# Patient Record
Sex: FEMALE | Race: WHITE | NOT HISPANIC OR LATINO | Employment: UNEMPLOYED | URBAN - METROPOLITAN AREA
[De-identification: names, ages, dates, MRNs, and addresses within clinical notes are randomized per-mention and may not be internally consistent; named-entity substitution may affect disease eponyms.]

---

## 2019-10-10 ENCOUNTER — HOSPITAL ENCOUNTER (EMERGENCY)
Facility: HOSPITAL | Age: 29
Discharge: HOME | End: 2019-10-11
Attending: EMERGENCY MEDICINE | Admitting: EMERGENCY MEDICINE

## 2019-10-10 ENCOUNTER — APPOINTMENT (OUTPATIENT)
Dept: CT IMAGING | Facility: HOSPITAL | Age: 29
End: 2019-10-10

## 2019-10-10 DIAGNOSIS — R10.12 LEFT UPPER QUADRANT PAIN: Primary | ICD-10-CM

## 2019-10-10 LAB
ALBUMIN SERPL-MCNC: 5.4 G/DL (ref 3.5–5.2)
ALBUMIN/GLOB SERPL: 1.5 G/DL
ALP SERPL-CCNC: 62 U/L (ref 39–117)
ALT SERPL W P-5'-P-CCNC: 72 U/L (ref 1–33)
ANION GAP SERPL CALCULATED.3IONS-SCNC: 20.9 MMOL/L (ref 5–15)
AST SERPL-CCNC: 57 U/L (ref 1–32)
BASOPHILS # BLD AUTO: 0.04 10*3/MM3 (ref 0–0.2)
BASOPHILS NFR BLD AUTO: 0.3 % (ref 0–1.5)
BILIRUB SERPL-MCNC: 1.4 MG/DL (ref 0.2–1.2)
BUN BLD-MCNC: 18 MG/DL (ref 6–20)
BUN/CREAT SERPL: 18.6 (ref 7–25)
CALCIUM SPEC-SCNC: 9.7 MG/DL (ref 8.6–10.5)
CHLORIDE SERPL-SCNC: 95 MMOL/L (ref 98–107)
CO2 SERPL-SCNC: 24.1 MMOL/L (ref 22–29)
CREAT BLD-MCNC: 0.97 MG/DL (ref 0.57–1)
DEPRECATED RDW RBC AUTO: 48.8 FL (ref 37–54)
EOSINOPHIL # BLD AUTO: 0 10*3/MM3 (ref 0–0.4)
EOSINOPHIL NFR BLD AUTO: 0 % (ref 0.3–6.2)
ERYTHROCYTE [DISTWIDTH] IN BLOOD BY AUTOMATED COUNT: 13.5 % (ref 12.3–15.4)
GFR SERPL CREATININE-BSD FRML MDRD: 69 ML/MIN/1.73
GLOBULIN UR ELPH-MCNC: 3.6 GM/DL
GLUCOSE BLD-MCNC: 120 MG/DL (ref 65–99)
HCG SERPL QL: NEGATIVE
HCT VFR BLD AUTO: 48.8 % (ref 34–46.6)
HGB BLD-MCNC: 16.1 G/DL (ref 12–15.9)
IMM GRANULOCYTES # BLD AUTO: 0.07 10*3/MM3 (ref 0–0.05)
IMM GRANULOCYTES NFR BLD AUTO: 0.5 % (ref 0–0.5)
LIPASE SERPL-CCNC: 16 U/L (ref 13–60)
LYMPHOCYTES # BLD AUTO: 1.62 10*3/MM3 (ref 0.7–3.1)
LYMPHOCYTES NFR BLD AUTO: 11.9 % (ref 19.6–45.3)
MCH RBC QN AUTO: 32.6 PG (ref 26.6–33)
MCHC RBC AUTO-ENTMCNC: 33 G/DL (ref 31.5–35.7)
MCV RBC AUTO: 98.8 FL (ref 79–97)
MONOCYTES # BLD AUTO: 1.12 10*3/MM3 (ref 0.1–0.9)
MONOCYTES NFR BLD AUTO: 8.3 % (ref 5–12)
NEUTROPHILS # BLD AUTO: 10.71 10*3/MM3 (ref 1.7–7)
NEUTROPHILS NFR BLD AUTO: 79 % (ref 42.7–76)
NRBC BLD AUTO-RTO: 0 /100 WBC (ref 0–0.2)
PLATELET # BLD AUTO: 333 10*3/MM3 (ref 140–450)
PMV BLD AUTO: 10.4 FL (ref 6–12)
POTASSIUM BLD-SCNC: 3.9 MMOL/L (ref 3.5–5.2)
PROT SERPL-MCNC: 9 G/DL (ref 6–8.5)
RBC # BLD AUTO: 4.94 10*6/MM3 (ref 3.77–5.28)
SODIUM BLD-SCNC: 140 MMOL/L (ref 136–145)
WBC NRBC COR # BLD: 13.56 10*3/MM3 (ref 3.4–10.8)

## 2019-10-10 PROCEDURE — 25010000002 IOPAMIDOL 61 % SOLUTION: Performed by: EMERGENCY MEDICINE

## 2019-10-10 PROCEDURE — 80307 DRUG TEST PRSMV CHEM ANLYZR: CPT | Performed by: EMERGENCY MEDICINE

## 2019-10-10 PROCEDURE — 25010000002 HYDROMORPHONE PER 4 MG: Performed by: EMERGENCY MEDICINE

## 2019-10-10 PROCEDURE — 80053 COMPREHEN METABOLIC PANEL: CPT | Performed by: EMERGENCY MEDICINE

## 2019-10-10 PROCEDURE — 25010000002 ONDANSETRON PER 1 MG: Performed by: EMERGENCY MEDICINE

## 2019-10-10 PROCEDURE — 96374 THER/PROPH/DIAG INJ IV PUSH: CPT

## 2019-10-10 PROCEDURE — 99283 EMERGENCY DEPT VISIT LOW MDM: CPT

## 2019-10-10 PROCEDURE — 96375 TX/PRO/DX INJ NEW DRUG ADDON: CPT

## 2019-10-10 PROCEDURE — 83690 ASSAY OF LIPASE: CPT | Performed by: EMERGENCY MEDICINE

## 2019-10-10 PROCEDURE — 84703 CHORIONIC GONADOTROPIN ASSAY: CPT | Performed by: EMERGENCY MEDICINE

## 2019-10-10 PROCEDURE — 85025 COMPLETE CBC W/AUTO DIFF WBC: CPT | Performed by: EMERGENCY MEDICINE

## 2019-10-10 PROCEDURE — 81001 URINALYSIS AUTO W/SCOPE: CPT | Performed by: EMERGENCY MEDICINE

## 2019-10-10 PROCEDURE — 74177 CT ABD & PELVIS W/CONTRAST: CPT

## 2019-10-10 RX ORDER — SODIUM CHLORIDE 9 MG/ML
125 INJECTION, SOLUTION INTRAVENOUS CONTINUOUS
Status: DISCONTINUED | OUTPATIENT
Start: 2019-10-10 | End: 2019-10-11 | Stop reason: HOSPADM

## 2019-10-10 RX ORDER — SODIUM CHLORIDE 0.9 % (FLUSH) 0.9 %
10 SYRINGE (ML) INJECTION AS NEEDED
Status: DISCONTINUED | OUTPATIENT
Start: 2019-10-10 | End: 2019-10-11 | Stop reason: HOSPADM

## 2019-10-10 RX ORDER — FAMOTIDINE 10 MG/ML
20 INJECTION, SOLUTION INTRAVENOUS ONCE
Status: COMPLETED | OUTPATIENT
Start: 2019-10-10 | End: 2019-10-10

## 2019-10-10 RX ORDER — HYDROMORPHONE HYDROCHLORIDE 1 MG/ML
0.5 INJECTION, SOLUTION INTRAMUSCULAR; INTRAVENOUS; SUBCUTANEOUS ONCE
Status: COMPLETED | OUTPATIENT
Start: 2019-10-10 | End: 2019-10-10

## 2019-10-10 RX ORDER — ONDANSETRON 2 MG/ML
4 INJECTION INTRAMUSCULAR; INTRAVENOUS ONCE
Status: COMPLETED | OUTPATIENT
Start: 2019-10-10 | End: 2019-10-10

## 2019-10-10 RX ADMIN — ONDANSETRON 4 MG: 2 INJECTION INTRAMUSCULAR; INTRAVENOUS at 21:36

## 2019-10-10 RX ADMIN — SODIUM CHLORIDE 1000 ML: 9 INJECTION, SOLUTION INTRAVENOUS at 23:55

## 2019-10-10 RX ADMIN — SODIUM CHLORIDE 1000 ML: 9 INJECTION, SOLUTION INTRAVENOUS at 21:36

## 2019-10-10 RX ADMIN — HYDROMORPHONE HYDROCHLORIDE 0.5 MG: 1 INJECTION, SOLUTION INTRAMUSCULAR; INTRAVENOUS; SUBCUTANEOUS at 21:36

## 2019-10-10 RX ADMIN — SODIUM CHLORIDE 125 ML/HR: 9 INJECTION, SOLUTION INTRAVENOUS at 21:36

## 2019-10-10 RX ADMIN — FAMOTIDINE 20 MG: 10 INJECTION, SOLUTION INTRAVENOUS at 23:55

## 2019-10-10 RX ADMIN — IOPAMIDOL 85 ML: 612 INJECTION, SOLUTION INTRAVENOUS at 22:39

## 2019-10-10 NOTE — ED TRIAGE NOTES
Pt reports left sided abdominal pain that radiates into her back. Pt reports nausea and vomiting. Pt reports this has been going on since Sunday.

## 2019-10-11 VITALS
HEART RATE: 70 BPM | DIASTOLIC BLOOD PRESSURE: 90 MMHG | WEIGHT: 158 LBS | HEIGHT: 64 IN | BODY MASS INDEX: 26.98 KG/M2 | OXYGEN SATURATION: 99 % | SYSTOLIC BLOOD PRESSURE: 129 MMHG | TEMPERATURE: 98.8 F | RESPIRATION RATE: 16 BRPM

## 2019-10-11 LAB
AMPHET+METHAMPHET UR QL: NEGATIVE
BACTERIA UR QL AUTO: ABNORMAL /HPF
BARBITURATES UR QL SCN: NEGATIVE
BENZODIAZ UR QL SCN: NEGATIVE
BILIRUB UR QL STRIP: NEGATIVE
CANNABINOIDS SERPL QL: POSITIVE
CLARITY UR: CLEAR
COCAINE UR QL: NEGATIVE
COLOR UR: ABNORMAL
GLUCOSE UR STRIP-MCNC: NEGATIVE MG/DL
HGB UR QL STRIP.AUTO: ABNORMAL
HYALINE CASTS UR QL AUTO: ABNORMAL /LPF
KETONES UR QL STRIP: ABNORMAL
LEUKOCYTE ESTERASE UR QL STRIP.AUTO: NEGATIVE
METHADONE UR QL SCN: NEGATIVE
MUCOUS THREADS URNS QL MICRO: ABNORMAL /HPF
NITRITE UR QL STRIP: NEGATIVE
OPIATES UR QL: NEGATIVE
OXYCODONE UR QL SCN: NEGATIVE
PH UR STRIP.AUTO: 5.5 [PH] (ref 5–8)
PROT UR QL STRIP: ABNORMAL
RBC # UR: ABNORMAL /HPF
REF LAB TEST METHOD: ABNORMAL
SP GR UR STRIP: >=1.03 (ref 1–1.03)
SQUAMOUS #/AREA URNS HPF: ABNORMAL /HPF
UROBILINOGEN UR QL STRIP: ABNORMAL
WBC UR QL AUTO: ABNORMAL /HPF

## 2019-10-11 RX ORDER — HYOSCYAMINE SULFATE EXTENDED-RELEASE 0.38 MG/1
0.38 TABLET ORAL EVERY 12 HOURS PRN
Qty: 10 TABLET | Refills: 0 | Status: SHIPPED | OUTPATIENT
Start: 2019-10-11 | End: 2020-02-05

## 2019-10-11 RX ORDER — OMEPRAZOLE 20 MG/1
20 CAPSULE, DELAYED RELEASE ORAL DAILY
Qty: 30 CAPSULE | Refills: 0 | Status: SHIPPED | OUTPATIENT
Start: 2019-10-11 | End: 2019-11-10

## 2019-10-11 NOTE — ED PROVIDER NOTES
EMERGENCY DEPARTMENT ENCOUNTER    CHIEF COMPLAINT  Chief Complaint: LUQ abdominal pain  History given by: patient  History limited by: nothing  Room Number: 31/31  PMD: Provider, No Known      HPI:  Pt is a 28 y.o. female who presents complaining of constant LUQ abdominal pain that started 5 days ago. Patient states she gets 'stomach pain every day.' Pt also c/o N/V and decreased urination. Pt is able to tolerate liquids but admits to decreased PO intake. She was seen in an ED 3 months ago for the same symptoms. She has undergone CT abd/pelvis and been scoped by GI. Patient states her GI doctor has suggested cholecystectomy due to poor functioning gallbladder but she has refused. Patient has refused this procedure because her pain is on her left side. Pt denies diarrhea, fever, cough, CP, SOA and all other complaints at this time. Patient has been prescribed Prilosec in the past. Patient admits to occasional marijuana use and states her symptoms can occasionally be relieved with warm showers.  This pain is similar to her past episodes of pain in location, quality, and severity.    Duration:  5 days  Onset: gradual  Timing: constant  Location: LUQ abdomen  Radiation: none  Quality: pain  Intensity/Severity: moderate  Progression: unchanged  Associated Symptoms: N/V, decreased urination, decreased PO intake  Aggravating Factors: none  Alleviating Factors: none  Previous Episodes: 'stomach pain every day'  Treatment before arrival: none    PAST MEDICAL HISTORY  Active Ambulatory Problems     Diagnosis Date Noted   • No Active Ambulatory Problems     Resolved Ambulatory Problems     Diagnosis Date Noted   • No Resolved Ambulatory Problems     No Additional Past Medical History       PAST SURGICAL HISTORY  No past surgical history on file.    FAMILY HISTORY  No family history on file.    SOCIAL HISTORY  Social History     Socioeconomic History   • Marital status: Single     Spouse name: Not on file   • Number of  children: Not on file   • Years of education: Not on file   • Highest education level: Not on file       ALLERGIES  Patient has no known allergies.    REVIEW OF SYSTEMS  Review of Systems   Constitutional: Negative for fever.        Positive: decreased PO intake   HENT: Negative for sore throat.    Eyes: Negative.    Respiratory: Negative for cough and shortness of breath.    Cardiovascular: Negative for chest pain.   Gastrointestinal: Positive for abdominal pain (LUQ), nausea and vomiting. Negative for diarrhea.   Genitourinary: Positive for decreased urine volume. Negative for dysuria.   Musculoskeletal: Negative for neck pain.   Skin: Negative for rash.   Allergic/Immunologic: Negative.    Neurological: Negative for weakness, numbness and headaches.   Hematological: Negative.    Psychiatric/Behavioral: Negative.    All other systems reviewed and are negative.      PHYSICAL EXAM  ED Triage Vitals [10/10/19 1821]   Temp Heart Rate Resp BP SpO2   98.7 °F (37.1 °C) 103 18 -- 94 %       Physical Exam   Constitutional: She is oriented to person, place, and time. No distress.   HENT:   Head: Normocephalic and atraumatic.   Eyes: EOM are normal. Pupils are equal, round, and reactive to light.   Neck: Normal range of motion. Neck supple.   Cardiovascular: Normal rate, regular rhythm and normal heart sounds.   RRR   Pulmonary/Chest: Effort normal and breath sounds normal. No respiratory distress.   Abdominal: Soft. There is tenderness. There is no rebound and no guarding.   LUQ and mid-upper abdominal tenderness without guarding or rebound.   Musculoskeletal: Normal range of motion. She exhibits no edema.   Intact distal pulses.   Neurological: She is alert and oriented to person, place, and time. She has normal sensation and normal strength.   Skin: Skin is warm and dry. No rash noted.   Psychiatric: Mood and affect normal.   Nursing note and vitals reviewed.      LAB RESULTS  Lab Results (last 24 hours)     Procedure  Component Value Units Date/Time    CBC & Differential [[id number]] Collected:  10/10/19 2135    Specimen:  Blood Updated:  10/10/19 2153    Narrative:       The following orders were created for panel order CBC & Differential.  Procedure                               Abnormality         Status                     ---------                               -----------         ------                     CBC Auto Differential[[id number]]        Abnormal            Final result                 Please view results for these tests on the individual orders.    Comprehensive Metabolic Panel [[id number]]  (Abnormal) Collected:  10/10/19 2135    Specimen:  Blood Updated:  10/10/19 2217     Glucose 120 mg/dL      BUN 18 mg/dL      Creatinine 0.97 mg/dL      Sodium 140 mmol/L      Potassium 3.9 mmol/L      Chloride 95 mmol/L      CO2 24.1 mmol/L      Calcium 9.7 mg/dL      Total Protein 9.0 g/dL      Albumin 5.40 g/dL      ALT (SGPT) 72 U/L      AST (SGOT) 57 U/L      Comment: Specimen hemolyzed.  Results may be affected.        Alkaline Phosphatase 62 U/L      Total Bilirubin 1.4 mg/dL      eGFR Non African Amer 69 mL/min/1.73      Globulin 3.6 gm/dL      A/G Ratio 1.5 g/dL      BUN/Creatinine Ratio 18.6     Anion Gap 20.9 mmol/L     Narrative:       GFR Normal >60  Chronic Kidney Disease <60  Kidney Failure <15    Lipase [[id number]]  (Normal) Collected:  10/10/19 2135    Specimen:  Blood Updated:  10/10/19 2215     Lipase 16 U/L     hCG, Serum, Qualitative [[id number]]  (Normal) Collected:  10/10/19 2135    Specimen:  Blood Updated:  10/10/19 2213     HCG Qualitative Negative    CBC Auto Differential [[id number]]  (Abnormal) Collected:  10/10/19 2135    Specimen:  Blood Updated:  10/10/19 2153     WBC 13.56 10*3/mm3      RBC 4.94 10*6/mm3      Hemoglobin 16.1 g/dL      Hematocrit 48.8 %      MCV 98.8 fL      MCH 32.6 pg      MCHC 33.0 g/dL      RDW 13.5 %      RDW-SD 48.8 fl      MPV 10.4 fL      Platelets 333 10*3/mm3       Neutrophil % 79.0 %      Lymphocyte % 11.9 %      Monocyte % 8.3 %      Eosinophil % 0.0 %      Basophil % 0.3 %      Immature Grans % 0.5 %      Neutrophils, Absolute 10.71 10*3/mm3      Lymphocytes, Absolute 1.62 10*3/mm3      Monocytes, Absolute 1.12 10*3/mm3      Eosinophils, Absolute 0.00 10*3/mm3      Basophils, Absolute 0.04 10*3/mm3      Immature Grans, Absolute 0.07 10*3/mm3      nRBC 0.0 /100 WBC     Urinalysis With Microscopic If Indicated (No Culture) - Urine, Clean Catch [[id number]]  (Abnormal) Collected:  10/10/19 2356    Specimen:  Urine, Clean Catch Updated:  10/11/19 0033     Color, UA Dark Yellow     Appearance, UA Clear     pH, UA 5.5     Specific Gravity, UA >=1.030     Glucose, UA Negative     Ketones, UA 40 mg/dL (2+)     Bilirubin, UA Negative     Blood, UA Large (3+)     Protein,  mg/dL (2+)     Leuk Esterase, UA Negative     Nitrite, UA Negative     Urobilinogen, UA 1.0 E.U./dL    Urine Drug Screen - Urine, Clean Catch [[id number]]  (Abnormal) Collected:  10/10/19 2356    Specimen:  Urine, Clean Catch Updated:  10/11/19 0033     Amphet/Methamphet, Screen Negative     Barbiturates Screen, Urine Negative     Benzodiazepine Screen, Urine Negative     Cocaine Screen, Urine Negative     Opiate Screen Negative     THC, Screen, Urine Positive     Methadone Screen, Urine Negative     Oxycodone Screen, Urine Negative    Narrative:       Negative Thresholds For Drugs Screened:     Amphetamines               500 ng/ml   Barbiturates               200 ng/ml   Benzodiazepines            100 ng/ml   Cocaine                    300 ng/ml   Methadone                  300 ng/ml   Opiates                    300 ng/ml   Oxycodone                  100 ng/ml   THC                        50 ng/ml    The Normal Value for all drugs tested is negative. This report includes final unconfirmed screening results to be used for medical treatment purposes only. Unconfirmed results must not be used for non-medical  purposes such as employment or legal testing. Clinical consideration should be applied to any drug of abuse test, particulary when unconfirmed results are used.    Urinalysis, Microscopic Only - Urine, Clean Catch [[id number]]  (Abnormal) Collected:  10/10/19 2357    Specimen:  Urine, Clean Catch Updated:  10/11/19 0044     RBC, UA 6-12 /HPF      WBC, UA 0-2 /HPF      Bacteria, UA None Seen /HPF      Squamous Epithelial Cells, UA 0-2 /HPF      Hyaline Casts, UA 13-20 /LPF      Mucus, UA Small/1+ /HPF      Methodology Manual Light Microscopy          I ordered the above labs and reviewed the results.      RADIOLOGY  CT Abdomen Pelvis With Contrast   Final Result   No acute findings.       Radiation dose reduction techniques were utilized, including automated   exposure control and exposure modulation based on body size.       This report was finalized on 10/10/2019 11:08 PM by Dr. Monique Pa M.D.               I ordered the above noted radiological studies. Interpreted by radiologist. Reviewed by me in PACS.         PROGRESS AND CONSULTS  ED Course as of Oct 11 0132   Thu Oct 10, 2019   2223 2 months ago she had an elevation of white blood cell count of 13,000 as well as 6 months ago.  Patient's pain is chronic recurrent abdominal pain with vomiting.  I do not suspect an infectious etiology she has no fever the mild elevation of white blood cell count very well could be reactive from the vomiting. WBC: (!) 13.56 [MM]   2224 No significant change previous hemoglobins but been in the mid 15 range Hemoglobin: (!) 16.1 [MM]   2224 Has mild elevation of liver function tests.  She has had 2 HIDA scans which shows poor functioning gallbladder but the patient is refused to have her gallbladder removed because her pain is on the left side.  I think this is unlikely Gart gallbladder etiology ALT (SGPT): (!) 72 [MM]   2225 CO2: 24.1 [MM]   Fri Oct 11, 2019   0051 I informed the patient the results of the test.  I am not  certain of the exact cause of the patient's symptoms.  The patient's repeat exam, test results, and history are not concerning for a serious etiology of their symptoms. I explained  to the patient that it is common, despite all our testing, that we cannot find the cause of someone symptoms in the emergency department.  Patient may present early in the disease process or it  is not readily apparent the cause of the patient's symptoms.  I instructed  the patient to return to the emergency department if fever develops, worsening of pain, or worsening of symptoms.  I instructed the patient to follow up with their physician for further evaluation in 2-3 days.  The patient understands and agrees with the treatment plan.    [MM]      ED Course User Index  [MM] Maikol Hale MD      2035. Ordered Dilaudid and Zofran.     2036. Ordered labs and CT abd/pelvis.    2320. Rechecked the patient who is stable and in NAD. No vomiting since she has been in the ED. Informed patient of unremarkable labs and negative CT abd/pelvis. Discussed plan to administer another bag of IVF for hydration and Pepcid and obtain UA prior to disposition. Discussed at length cannabinoid hyperemesis syndrome and IBS and the need to follow with gastroenterology. Advised patient to minimize or terminate marijuana use and evaluate symptom change or resolution. Pt and family agree with this plan.     2332. Ordered Pepcid.     0035. Rechecked the patient who is resting comfortably. Informed patient of negative UA. Patient will be discharged with prescriptions to manage her symptoms. Pt understands and agrees with the plan, all questions answered.      MEDICAL DECISION MAKING  Results were reviewed/discussed with the patient and they were also made aware of online access. Pt also made aware that some labs, such as cultures, will not be resulted during ER visit and follow up with PMD is necessary.     MDM  Number of Diagnoses or Management Options  Chronic  abdominal pain:      Amount and/or Complexity of Data Reviewed  Clinical lab tests: ordered and reviewed (WBC 13.56)  Tests in the radiology section of CPT®: ordered and reviewed (Nothing acute seen on CT abd/pelvis)  Decide to obtain previous medical records or to obtain history from someone other than the patient: yes (Epic)  Review and summarize past medical records: yes (Seen 7/24/2019 for LUQ abdominal pain at [facility]. CT abd/pelvis at this time showed no acute abnormalities. Patient was also seen 4/5/2019 for abdominal pain and vomiting at [facility]. HIDA scan in the past which was concerning for biliary dyskinesia. 8 ED visits in the past two years for abdominal pain.)    Patient Progress  Patient progress: stable         DIAGNOSIS  Final diagnoses:   Left upper quadrant pain: Acute on chronic reoccurring       DISPOSITION  DISCHARGE    Patient discharged in stable condition.    Reviewed implications of results, diagnosis, meds, responsibility to follow up, warning signs and symptoms of possible worsening, potential complications and reasons to return to ER.    Patient/Family voiced understanding of above instructions.    Discussed plan for discharge, as there is no emergent indication for admission. Patient referred to primary care provider for BP management due to today's BP. Pt/family is agreeable and understands need for follow up and repeat testing.  Pt is aware that discharge does not mean that nothing is wrong but it indicates no emergency is present that requires admission and they must continue care with follow-up as given below or physician of their choice.     FOLLOW-UP  Brinda Muse MD  [address] [address]  [phone]      Follow-up with her in the next week., Return if pain worsens, If symptoms worsen, shortness of breath, fever, any concerns         Medication List      New Prescriptions    hyoscyamine 0.375 MG 12 hr tablet  Commonly known as:  LEVBID  Take 1 tablet  by mouth Every 12 (Twelve) Hours As Needed for Cramping for   up to 10 doses.     omeprazole 20 MG capsule  Commonly known as:  priLOSEC  Take 1 capsule by mouth Daily for 30 doses.            Latest Documented Vital Signs:  As of 1:32 AM  BP- 129/90 HR- 70 Temp- 98.8 °F (37.1 °C) (Oral) O2 sat- 99%    --  Documentation assistance provided by sloan Tran for Dr Alexia MD.  Information recorded by the scribe was done at my direction and has been verified and validated by me.       Lucie Tran  10/11/19 0109       Maikol Hale MD  10/11/19 0138

## 2019-10-11 NOTE — DISCHARGE INSTRUCTIONS
Start with clear liquids and advance diet as tolerated.  No fatty foods or processed foods.  No alcohol or caffeine.  Try not to smoke marijuana.  Take Levbid and Prilosec as prescribed.

## 2019-12-24 ENCOUNTER — HOSPITAL ENCOUNTER (INPATIENT)
Facility: HOSPITAL | Age: 29
LOS: 7 days | Discharge: HOME | End: 2019-12-31
Attending: EMERGENCY MEDICINE | Admitting: HOSPITALIST

## 2019-12-24 ENCOUNTER — APPOINTMENT (OUTPATIENT)
Dept: GENERAL RADIOLOGY | Facility: HOSPITAL | Age: 29
End: 2019-12-24

## 2019-12-24 ENCOUNTER — APPOINTMENT (OUTPATIENT)
Dept: CT IMAGING | Facility: HOSPITAL | Age: 29
End: 2019-12-24

## 2019-12-24 DIAGNOSIS — K52.9 COLITIS: Primary | ICD-10-CM

## 2019-12-24 PROBLEM — R10.9 CHRONIC ABDOMINAL PAIN: Status: ACTIVE | Noted: 2019-12-24

## 2019-12-24 PROBLEM — D72.829 LEUKOCYTOSIS: Status: ACTIVE | Noted: 2019-12-24

## 2019-12-24 PROBLEM — D75.89 MACROCYTOSIS: Status: ACTIVE | Noted: 2019-12-24

## 2019-12-24 PROBLEM — G89.29 CHRONIC ABDOMINAL PAIN: Status: ACTIVE | Noted: 2019-12-24

## 2019-12-24 PROBLEM — K92.1 HEMATOCHEZIA: Status: ACTIVE | Noted: 2019-12-24

## 2019-12-24 PROBLEM — F12.10 TETRAHYDROCANNABINOL (THC) USE DISORDER, MILD, ABUSE: Status: ACTIVE | Noted: 2019-12-24

## 2019-12-24 LAB
ALBUMIN SERPL-MCNC: 5.2 G/DL (ref 3.5–5.2)
ALBUMIN/GLOB SERPL: 1.4 G/DL
ALP SERPL-CCNC: 63 U/L (ref 39–117)
ALT SERPL W P-5'-P-CCNC: 17 U/L (ref 1–33)
AMPHET+METHAMPHET UR QL: NEGATIVE
ANION GAP SERPL CALCULATED.3IONS-SCNC: 22.2 MMOL/L (ref 5–15)
AST SERPL-CCNC: 19 U/L (ref 1–32)
BACTERIA UR QL AUTO: ABNORMAL /HPF
BARBITURATES UR QL SCN: NEGATIVE
BASOPHILS # BLD AUTO: 0.02 10*3/MM3 (ref 0–0.2)
BASOPHILS NFR BLD AUTO: 0.1 % (ref 0–1.5)
BENZODIAZ UR QL SCN: NEGATIVE
BILIRUB SERPL-MCNC: 0.5 MG/DL (ref 0.2–1.2)
BILIRUB UR QL STRIP: NEGATIVE
BUN BLD-MCNC: 13 MG/DL (ref 6–20)
BUN/CREAT SERPL: 15.9 (ref 7–25)
CALCIUM SPEC-SCNC: 10.3 MG/DL (ref 8.6–10.5)
CANNABINOIDS SERPL QL: POSITIVE
CHLORIDE SERPL-SCNC: 100 MMOL/L (ref 98–107)
CLARITY UR: CLEAR
CO2 SERPL-SCNC: 17.8 MMOL/L (ref 22–29)
COCAINE UR QL: NEGATIVE
COLOR UR: YELLOW
CREAT BLD-MCNC: 0.82 MG/DL (ref 0.57–1)
CRP SERPL-MCNC: 1.4 MG/DL (ref 0–0.5)
D-LACTATE SERPL-SCNC: 0.7 MMOL/L (ref 0.5–2)
DEPRECATED RDW RBC AUTO: 45.3 FL (ref 37–54)
EOSINOPHIL # BLD AUTO: 0 10*3/MM3 (ref 0–0.4)
EOSINOPHIL NFR BLD AUTO: 0 % (ref 0.3–6.2)
ERYTHROCYTE [DISTWIDTH] IN BLOOD BY AUTOMATED COUNT: 12.7 % (ref 12.3–15.4)
ERYTHROCYTE [SEDIMENTATION RATE] IN BLOOD: 14 MM/HR (ref 0–20)
GFR SERPL CREATININE-BSD FRML MDRD: 83 ML/MIN/1.73
GLOBULIN UR ELPH-MCNC: 3.6 GM/DL
GLUCOSE BLD-MCNC: 149 MG/DL (ref 65–99)
GLUCOSE UR STRIP-MCNC: ABNORMAL MG/DL
HCG SERPL QL: NEGATIVE
HCT VFR BLD AUTO: 44.3 % (ref 34–46.6)
HGB BLD-MCNC: 14.8 G/DL (ref 12–15.9)
HGB UR QL STRIP.AUTO: ABNORMAL
HYALINE CASTS UR QL AUTO: ABNORMAL /LPF
IMM GRANULOCYTES # BLD AUTO: 0.11 10*3/MM3 (ref 0–0.05)
IMM GRANULOCYTES NFR BLD AUTO: 0.5 % (ref 0–0.5)
KETONES UR QL STRIP: ABNORMAL
LEUKOCYTE ESTERASE UR QL STRIP.AUTO: NEGATIVE
LIPASE SERPL-CCNC: 14 U/L (ref 13–60)
LYMPHOCYTES # BLD AUTO: 1.08 10*3/MM3 (ref 0.7–3.1)
LYMPHOCYTES NFR BLD AUTO: 5.3 % (ref 19.6–45.3)
MCH RBC QN AUTO: 32.8 PG (ref 26.6–33)
MCHC RBC AUTO-ENTMCNC: 33.4 G/DL (ref 31.5–35.7)
MCV RBC AUTO: 98.2 FL (ref 79–97)
METHADONE UR QL SCN: NEGATIVE
MONOCYTES # BLD AUTO: 0.63 10*3/MM3 (ref 0.1–0.9)
MONOCYTES NFR BLD AUTO: 3.1 % (ref 5–12)
NEUTROPHILS # BLD AUTO: 18.44 10*3/MM3 (ref 1.7–7)
NEUTROPHILS NFR BLD AUTO: 91 % (ref 42.7–76)
NITRITE UR QL STRIP: NEGATIVE
NRBC BLD AUTO-RTO: 0 /100 WBC (ref 0–0.2)
OPIATES UR QL: NEGATIVE
OXYCODONE UR QL SCN: NEGATIVE
PH UR STRIP.AUTO: 7 [PH] (ref 5–8)
PLATELET # BLD AUTO: 333 10*3/MM3 (ref 140–450)
PMV BLD AUTO: 10.1 FL (ref 6–12)
POTASSIUM BLD-SCNC: 3.7 MMOL/L (ref 3.5–5.2)
PROT SERPL-MCNC: 8.8 G/DL (ref 6–8.5)
PROT UR QL STRIP: ABNORMAL
RBC # BLD AUTO: 4.51 10*6/MM3 (ref 3.77–5.28)
RBC # UR: ABNORMAL /HPF
REF LAB TEST METHOD: ABNORMAL
SODIUM BLD-SCNC: 140 MMOL/L (ref 136–145)
SP GR UR STRIP: >=1.03 (ref 1–1.03)
SQUAMOUS #/AREA URNS HPF: ABNORMAL /HPF
UROBILINOGEN UR QL STRIP: ABNORMAL
WBC NRBC COR # BLD: 20.28 10*3/MM3 (ref 3.4–10.8)
WBC UR QL AUTO: ABNORMAL /HPF

## 2019-12-24 PROCEDURE — 80053 COMPREHEN METABOLIC PANEL: CPT | Performed by: EMERGENCY MEDICINE

## 2019-12-24 PROCEDURE — 25010000002 ONDANSETRON PER 1 MG: Performed by: NURSE PRACTITIONER

## 2019-12-24 PROCEDURE — 99254 IP/OBS CNSLTJ NEW/EST MOD 60: CPT | Performed by: INTERNAL MEDICINE

## 2019-12-24 PROCEDURE — 83605 ASSAY OF LACTIC ACID: CPT | Performed by: EMERGENCY MEDICINE

## 2019-12-24 PROCEDURE — 25010000002 LEVOFLOXACIN PER 250 MG: Performed by: EMERGENCY MEDICINE

## 2019-12-24 PROCEDURE — 83690 ASSAY OF LIPASE: CPT | Performed by: EMERGENCY MEDICINE

## 2019-12-24 PROCEDURE — 80307 DRUG TEST PRSMV CHEM ANLYZR: CPT | Performed by: EMERGENCY MEDICINE

## 2019-12-24 PROCEDURE — 25010000002 HYDROMORPHONE PER 4 MG: Performed by: EMERGENCY MEDICINE

## 2019-12-24 PROCEDURE — 86140 C-REACTIVE PROTEIN: CPT | Performed by: NURSE PRACTITIONER

## 2019-12-24 PROCEDURE — 99283 EMERGENCY DEPT VISIT LOW MDM: CPT

## 2019-12-24 PROCEDURE — 71046 X-RAY EXAM CHEST 2 VIEWS: CPT

## 2019-12-24 PROCEDURE — 25010000002 IOPAMIDOL 61 % SOLUTION: Performed by: EMERGENCY MEDICINE

## 2019-12-24 PROCEDURE — 84703 CHORIONIC GONADOTROPIN ASSAY: CPT | Performed by: EMERGENCY MEDICINE

## 2019-12-24 PROCEDURE — 25010000002 ONDANSETRON PER 1 MG: Performed by: EMERGENCY MEDICINE

## 2019-12-24 PROCEDURE — 85652 RBC SED RATE AUTOMATED: CPT | Performed by: NURSE PRACTITIONER

## 2019-12-24 PROCEDURE — 85025 COMPLETE CBC W/AUTO DIFF WBC: CPT | Performed by: EMERGENCY MEDICINE

## 2019-12-24 PROCEDURE — 74177 CT ABD & PELVIS W/CONTRAST: CPT

## 2019-12-24 PROCEDURE — 25010000002 MORPHINE PER 10 MG: Performed by: NURSE PRACTITIONER

## 2019-12-24 PROCEDURE — 87040 BLOOD CULTURE FOR BACTERIA: CPT | Performed by: EMERGENCY MEDICINE

## 2019-12-24 PROCEDURE — 81001 URINALYSIS AUTO W/SCOPE: CPT | Performed by: EMERGENCY MEDICINE

## 2019-12-24 RX ORDER — SODIUM CHLORIDE 0.9 % (FLUSH) 0.9 %
10 SYRINGE (ML) INJECTION EVERY 12 HOURS SCHEDULED
Status: DISCONTINUED | OUTPATIENT
Start: 2019-12-24 | End: 2019-12-31 | Stop reason: HOSPADM

## 2019-12-24 RX ORDER — ONDANSETRON 2 MG/ML
4 INJECTION INTRAMUSCULAR; INTRAVENOUS ONCE
Status: COMPLETED | OUTPATIENT
Start: 2019-12-24 | End: 2019-12-24

## 2019-12-24 RX ORDER — SODIUM CHLORIDE 9 MG/ML
100 INJECTION, SOLUTION INTRAVENOUS CONTINUOUS
Status: DISCONTINUED | OUTPATIENT
Start: 2019-12-24 | End: 2019-12-31 | Stop reason: HOSPADM

## 2019-12-24 RX ORDER — ACETAMINOPHEN 325 MG/1
650 TABLET ORAL EVERY 4 HOURS PRN
Status: DISCONTINUED | OUTPATIENT
Start: 2019-12-24 | End: 2019-12-31 | Stop reason: HOSPADM

## 2019-12-24 RX ORDER — ACETAMINOPHEN 650 MG/1
650 SUPPOSITORY RECTAL EVERY 4 HOURS PRN
Status: DISCONTINUED | OUTPATIENT
Start: 2019-12-24 | End: 2019-12-31 | Stop reason: HOSPADM

## 2019-12-24 RX ORDER — SODIUM CHLORIDE 0.9 % (FLUSH) 0.9 %
10 SYRINGE (ML) INJECTION AS NEEDED
Status: DISCONTINUED | OUTPATIENT
Start: 2019-12-24 | End: 2019-12-31 | Stop reason: HOSPADM

## 2019-12-24 RX ORDER — MORPHINE SULFATE 2 MG/ML
4 INJECTION, SOLUTION INTRAMUSCULAR; INTRAVENOUS EVERY 4 HOURS PRN
Status: DISCONTINUED | OUTPATIENT
Start: 2019-12-24 | End: 2019-12-31 | Stop reason: HOSPADM

## 2019-12-24 RX ORDER — LEVOFLOXACIN 5 MG/ML
500 INJECTION, SOLUTION INTRAVENOUS ONCE
Status: COMPLETED | OUTPATIENT
Start: 2019-12-24 | End: 2019-12-24

## 2019-12-24 RX ORDER — FAMOTIDINE 10 MG/ML
20 INJECTION, SOLUTION INTRAVENOUS ONCE
Status: COMPLETED | OUTPATIENT
Start: 2019-12-24 | End: 2019-12-24

## 2019-12-24 RX ORDER — ACETAMINOPHEN 160 MG/5ML
650 SOLUTION ORAL EVERY 4 HOURS PRN
Status: DISCONTINUED | OUTPATIENT
Start: 2019-12-24 | End: 2019-12-31 | Stop reason: HOSPADM

## 2019-12-24 RX ORDER — ONDANSETRON 4 MG/1
4 TABLET, FILM COATED ORAL EVERY 6 HOURS PRN
Status: DISCONTINUED | OUTPATIENT
Start: 2019-12-24 | End: 2019-12-31 | Stop reason: HOSPADM

## 2019-12-24 RX ORDER — ONDANSETRON 2 MG/ML
4 INJECTION INTRAMUSCULAR; INTRAVENOUS EVERY 6 HOURS PRN
Status: DISCONTINUED | OUTPATIENT
Start: 2019-12-24 | End: 2019-12-31 | Stop reason: HOSPADM

## 2019-12-24 RX ORDER — LEVOFLOXACIN 5 MG/ML
500 INJECTION, SOLUTION INTRAVENOUS EVERY 24 HOURS
Status: COMPLETED | OUTPATIENT
Start: 2019-12-25 | End: 2019-12-29

## 2019-12-24 RX ORDER — HYDROMORPHONE HYDROCHLORIDE 1 MG/ML
0.5 INJECTION, SOLUTION INTRAMUSCULAR; INTRAVENOUS; SUBCUTANEOUS ONCE
Status: COMPLETED | OUTPATIENT
Start: 2019-12-24 | End: 2019-12-24

## 2019-12-24 RX ADMIN — ONDANSETRON 4 MG: 2 INJECTION INTRAMUSCULAR; INTRAVENOUS at 16:20

## 2019-12-24 RX ADMIN — IOPAMIDOL 100 ML: 612 INJECTION, SOLUTION INTRAVENOUS at 09:37

## 2019-12-24 RX ADMIN — SODIUM CHLORIDE 1000 ML: 9 INJECTION, SOLUTION INTRAVENOUS at 08:59

## 2019-12-24 RX ADMIN — ONDANSETRON 4 MG: 2 INJECTION INTRAMUSCULAR; INTRAVENOUS at 08:31

## 2019-12-24 RX ADMIN — METRONIDAZOLE 500 MG: 500 INJECTION, SOLUTION INTRAVENOUS at 11:46

## 2019-12-24 RX ADMIN — METRONIDAZOLE 500 MG: 500 INJECTION, SOLUTION INTRAVENOUS at 19:34

## 2019-12-24 RX ADMIN — MORPHINE SULFATE 4 MG: 2 INJECTION, SOLUTION INTRAMUSCULAR; INTRAVENOUS at 16:20

## 2019-12-24 RX ADMIN — FAMOTIDINE 20 MG: 10 INJECTION INTRAVENOUS at 08:31

## 2019-12-24 RX ADMIN — HYDROMORPHONE HYDROCHLORIDE 0.5 MG: 1 INJECTION, SOLUTION INTRAMUSCULAR; INTRAVENOUS; SUBCUTANEOUS at 08:31

## 2019-12-24 RX ADMIN — LEVOFLOXACIN 500 MG: 5 INJECTION, SOLUTION INTRAVENOUS at 10:42

## 2019-12-25 PROBLEM — D72.829 LEUKOCYTOSIS: Status: RESOLVED | Noted: 2019-12-24 | Resolved: 2019-12-25

## 2019-12-25 LAB
ADV 40+41 DNA STL QL NAA+NON-PROBE: NOT DETECTED
ANION GAP SERPL CALCULATED.3IONS-SCNC: 9.7 MMOL/L (ref 5–15)
ASTRO TYP 1-8 RNA STL QL NAA+NON-PROBE: NOT DETECTED
BUN BLD-MCNC: 9 MG/DL (ref 6–20)
BUN/CREAT SERPL: 11.7 (ref 7–25)
C CAYETANENSIS DNA STL QL NAA+NON-PROBE: NOT DETECTED
CALCIUM SPEC-SCNC: 8.9 MG/DL (ref 8.6–10.5)
CAMPY SP DNA.DIARRHEA STL QL NAA+PROBE: NOT DETECTED
CHLORIDE SERPL-SCNC: 108 MMOL/L (ref 98–107)
CO2 SERPL-SCNC: 25.3 MMOL/L (ref 22–29)
CREAT BLD-MCNC: 0.77 MG/DL (ref 0.57–1)
CRYPTOSP STL CULT: NOT DETECTED
DEPRECATED RDW RBC AUTO: 44.1 FL (ref 37–54)
E COLI DNA SPEC QL NAA+PROBE: NOT DETECTED
E HISTOLYT AG STL-ACNC: NOT DETECTED
EAEC PAA PLAS AGGR+AATA ST NAA+NON-PRB: NOT DETECTED
EC STX1 + STX2 GENES STL NAA+PROBE: NOT DETECTED
EPEC EAE GENE STL QL NAA+NON-PROBE: NOT DETECTED
ERYTHROCYTE [DISTWIDTH] IN BLOOD BY AUTOMATED COUNT: 12.5 % (ref 12.3–15.4)
ETEC LTA+ST1A+ST1B TOX ST NAA+NON-PROBE: NOT DETECTED
FOLATE SERPL-MCNC: 2.46 NG/ML (ref 4.78–24.2)
G LAMBLIA DNA SPEC QL NAA+PROBE: NOT DETECTED
GFR SERPL CREATININE-BSD FRML MDRD: 89 ML/MIN/1.73
GLUCOSE BLD-MCNC: 83 MG/DL (ref 65–99)
HCT VFR BLD AUTO: 36.6 % (ref 34–46.6)
HGB BLD-MCNC: 12.5 G/DL (ref 12–15.9)
LACTOFERRIN STL QL LA: NEGATIVE
MCH RBC QN AUTO: 33.4 PG (ref 26.6–33)
MCHC RBC AUTO-ENTMCNC: 34.2 G/DL (ref 31.5–35.7)
MCV RBC AUTO: 97.9 FL (ref 79–97)
NOROVIRUS GI+II RNA STL QL NAA+NON-PROBE: NOT DETECTED
P SHIGELLOIDES DNA STL QL NAA+PROBE: NOT DETECTED
PLATELET # BLD AUTO: 201 10*3/MM3 (ref 140–450)
PMV BLD AUTO: 9.9 FL (ref 6–12)
POTASSIUM BLD-SCNC: 3.6 MMOL/L (ref 3.5–5.2)
RBC # BLD AUTO: 3.74 10*6/MM3 (ref 3.77–5.28)
RV RNA STL NAA+PROBE: NOT DETECTED
SALMONELLA DNA SPEC QL NAA+PROBE: NOT DETECTED
SAPO I+II+IV+V RNA STL QL NAA+NON-PROBE: NOT DETECTED
SHIGELLA SP+EIEC IPAH STL QL NAA+PROBE: NOT DETECTED
SODIUM BLD-SCNC: 143 MMOL/L (ref 136–145)
V CHOLERAE DNA SPEC QL NAA+PROBE: NOT DETECTED
VIBRIO DNA SPEC NAA+PROBE: NOT DETECTED
VIT B12 BLD-MCNC: 287 PG/ML (ref 211–946)
WBC NRBC COR # BLD: 8.78 10*3/MM3 (ref 3.4–10.8)
YERSINIA STL CULT: NOT DETECTED

## 2019-12-25 PROCEDURE — 25010000002 MORPHINE PER 10 MG: Performed by: NURSE PRACTITIONER

## 2019-12-25 PROCEDURE — 83993 ASSAY FOR CALPROTECTIN FECAL: CPT | Performed by: NURSE PRACTITIONER

## 2019-12-25 PROCEDURE — 85027 COMPLETE CBC AUTOMATED: CPT | Performed by: NURSE PRACTITIONER

## 2019-12-25 PROCEDURE — 99232 SBSQ HOSP IP/OBS MODERATE 35: CPT | Performed by: INTERNAL MEDICINE

## 2019-12-25 PROCEDURE — 80048 BASIC METABOLIC PNL TOTAL CA: CPT | Performed by: NURSE PRACTITIONER

## 2019-12-25 PROCEDURE — 0097U HC BIOFIRE FILMARRAY GI PANEL: CPT | Performed by: HOSPITALIST

## 2019-12-25 PROCEDURE — 25010000002 LEVOFLOXACIN PER 250 MG: Performed by: NURSE PRACTITIONER

## 2019-12-25 PROCEDURE — 82607 VITAMIN B-12: CPT | Performed by: NURSE PRACTITIONER

## 2019-12-25 PROCEDURE — 83631 LACTOFERRIN FECAL (QUANT): CPT | Performed by: NURSE PRACTITIONER

## 2019-12-25 PROCEDURE — 25010000002 ONDANSETRON PER 1 MG: Performed by: NURSE PRACTITIONER

## 2019-12-25 PROCEDURE — 82746 ASSAY OF FOLIC ACID SERUM: CPT | Performed by: NURSE PRACTITIONER

## 2019-12-25 RX ORDER — FAMOTIDINE 20 MG/1
20 TABLET, FILM COATED ORAL
Status: DISCONTINUED | OUTPATIENT
Start: 2019-12-25 | End: 2019-12-31 | Stop reason: HOSPADM

## 2019-12-25 RX ADMIN — FAMOTIDINE 20 MG: 20 TABLET, FILM COATED ORAL at 16:34

## 2019-12-25 RX ADMIN — SODIUM CHLORIDE, PRESERVATIVE FREE 10 ML: 5 INJECTION INTRAVENOUS at 20:09

## 2019-12-25 RX ADMIN — ONDANSETRON 4 MG: 2 INJECTION INTRAMUSCULAR; INTRAVENOUS at 14:48

## 2019-12-25 RX ADMIN — MORPHINE SULFATE 4 MG: 2 INJECTION, SOLUTION INTRAMUSCULAR; INTRAVENOUS at 13:01

## 2019-12-25 RX ADMIN — ONDANSETRON 4 MG: 2 INJECTION INTRAMUSCULAR; INTRAVENOUS at 20:09

## 2019-12-25 RX ADMIN — METRONIDAZOLE 500 MG: 500 INJECTION, SOLUTION INTRAVENOUS at 18:44

## 2019-12-25 RX ADMIN — SODIUM CHLORIDE 100 ML/HR: 9 INJECTION, SOLUTION INTRAVENOUS at 02:27

## 2019-12-25 RX ADMIN — MORPHINE SULFATE 4 MG: 2 INJECTION, SOLUTION INTRAMUSCULAR; INTRAVENOUS at 02:28

## 2019-12-25 RX ADMIN — ONDANSETRON 4 MG: 2 INJECTION INTRAMUSCULAR; INTRAVENOUS at 02:28

## 2019-12-25 RX ADMIN — LEVOFLOXACIN 500 MG: 5 INJECTION, SOLUTION INTRAVENOUS at 08:56

## 2019-12-25 RX ADMIN — METRONIDAZOLE 500 MG: 500 INJECTION, SOLUTION INTRAVENOUS at 02:21

## 2019-12-25 RX ADMIN — ONDANSETRON 4 MG: 2 INJECTION INTRAMUSCULAR; INTRAVENOUS at 08:54

## 2019-12-25 RX ADMIN — SERTRALINE 50 MG: 50 TABLET, FILM COATED ORAL at 13:01

## 2019-12-25 RX ADMIN — METRONIDAZOLE 500 MG: 500 INJECTION, SOLUTION INTRAVENOUS at 11:09

## 2019-12-25 RX ADMIN — SODIUM CHLORIDE 100 ML/HR: 9 INJECTION, SOLUTION INTRAVENOUS at 16:36

## 2019-12-25 RX ADMIN — MORPHINE SULFATE 4 MG: 2 INJECTION, SOLUTION INTRAMUSCULAR; INTRAVENOUS at 20:09

## 2019-12-25 RX ADMIN — MORPHINE SULFATE 4 MG: 2 INJECTION, SOLUTION INTRAMUSCULAR; INTRAVENOUS at 08:54

## 2019-12-26 LAB
BASOPHILS # BLD AUTO: 0.04 10*3/MM3 (ref 0–0.2)
BASOPHILS NFR BLD AUTO: 0.6 % (ref 0–1.5)
DEPRECATED RDW RBC AUTO: 44.2 FL (ref 37–54)
EOSINOPHIL # BLD AUTO: 0.04 10*3/MM3 (ref 0–0.4)
EOSINOPHIL NFR BLD AUTO: 0.6 % (ref 0.3–6.2)
ERYTHROCYTE [DISTWIDTH] IN BLOOD BY AUTOMATED COUNT: 12.6 % (ref 12.3–15.4)
HCT VFR BLD AUTO: 35.1 % (ref 34–46.6)
HGB BLD-MCNC: 12.1 G/DL (ref 12–15.9)
IMM GRANULOCYTES # BLD AUTO: 0.03 10*3/MM3 (ref 0–0.05)
IMM GRANULOCYTES NFR BLD AUTO: 0.4 % (ref 0–0.5)
LYMPHOCYTES # BLD AUTO: 1.92 10*3/MM3 (ref 0.7–3.1)
LYMPHOCYTES NFR BLD AUTO: 27.2 % (ref 19.6–45.3)
MCH RBC QN AUTO: 33 PG (ref 26.6–33)
MCHC RBC AUTO-ENTMCNC: 34.5 G/DL (ref 31.5–35.7)
MCV RBC AUTO: 95.6 FL (ref 79–97)
MONOCYTES # BLD AUTO: 0.52 10*3/MM3 (ref 0.1–0.9)
MONOCYTES NFR BLD AUTO: 7.4 % (ref 5–12)
NEUTROPHILS # BLD AUTO: 4.5 10*3/MM3 (ref 1.7–7)
NEUTROPHILS NFR BLD AUTO: 63.8 % (ref 42.7–76)
NRBC BLD AUTO-RTO: 0 /100 WBC (ref 0–0.2)
PLATELET # BLD AUTO: 179 10*3/MM3 (ref 140–450)
PMV BLD AUTO: 10.1 FL (ref 6–12)
RBC # BLD AUTO: 3.67 10*6/MM3 (ref 3.77–5.28)
WBC NRBC COR # BLD: 7.05 10*3/MM3 (ref 3.4–10.8)

## 2019-12-26 PROCEDURE — 85025 COMPLETE CBC W/AUTO DIFF WBC: CPT | Performed by: INTERNAL MEDICINE

## 2019-12-26 PROCEDURE — 25010000002 ONDANSETRON PER 1 MG: Performed by: NURSE PRACTITIONER

## 2019-12-26 PROCEDURE — 25010000002 LEVOFLOXACIN PER 250 MG: Performed by: NURSE PRACTITIONER

## 2019-12-26 PROCEDURE — 99232 SBSQ HOSP IP/OBS MODERATE 35: CPT | Performed by: INTERNAL MEDICINE

## 2019-12-26 RX ORDER — METRONIDAZOLE 250 MG/1
500 TABLET ORAL EVERY 8 HOURS SCHEDULED
Status: DISCONTINUED | OUTPATIENT
Start: 2019-12-26 | End: 2019-12-31 | Stop reason: HOSPADM

## 2019-12-26 RX ADMIN — METRONIDAZOLE 500 MG: 500 INJECTION, SOLUTION INTRAVENOUS at 03:44

## 2019-12-26 RX ADMIN — SODIUM CHLORIDE, PRESERVATIVE FREE 10 ML: 5 INJECTION INTRAVENOUS at 09:38

## 2019-12-26 RX ADMIN — SODIUM CHLORIDE 100 ML/HR: 9 INJECTION, SOLUTION INTRAVENOUS at 03:46

## 2019-12-26 RX ADMIN — LEVOFLOXACIN 500 MG: 5 INJECTION, SOLUTION INTRAVENOUS at 09:38

## 2019-12-26 RX ADMIN — METRONIDAZOLE 500 MG: 250 TABLET ORAL at 21:51

## 2019-12-26 RX ADMIN — SODIUM CHLORIDE 100 ML/HR: 9 INJECTION, SOLUTION INTRAVENOUS at 16:06

## 2019-12-26 RX ADMIN — SERTRALINE 50 MG: 50 TABLET, FILM COATED ORAL at 09:38

## 2019-12-26 RX ADMIN — FAMOTIDINE 20 MG: 20 TABLET, FILM COATED ORAL at 06:19

## 2019-12-26 RX ADMIN — ONDANSETRON 4 MG: 2 INJECTION INTRAMUSCULAR; INTRAVENOUS at 21:55

## 2019-12-26 RX ADMIN — SODIUM CHLORIDE, PRESERVATIVE FREE 10 ML: 5 INJECTION INTRAVENOUS at 22:00

## 2019-12-26 RX ADMIN — FAMOTIDINE 20 MG: 20 TABLET, FILM COATED ORAL at 16:58

## 2019-12-27 PROCEDURE — 25010000002 MORPHINE PER 10 MG: Performed by: NURSE PRACTITIONER

## 2019-12-27 PROCEDURE — 25010000002 LEVOFLOXACIN PER 250 MG: Performed by: NURSE PRACTITIONER

## 2019-12-27 PROCEDURE — 25010000002 ONDANSETRON PER 1 MG: Performed by: NURSE PRACTITIONER

## 2019-12-27 PROCEDURE — 99231 SBSQ HOSP IP/OBS SF/LOW 25: CPT | Performed by: INTERNAL MEDICINE

## 2019-12-27 RX ADMIN — METRONIDAZOLE 500 MG: 250 TABLET ORAL at 14:03

## 2019-12-27 RX ADMIN — SERTRALINE 50 MG: 50 TABLET, FILM COATED ORAL at 08:55

## 2019-12-27 RX ADMIN — ACETAMINOPHEN 650 MG: 325 TABLET, FILM COATED ORAL at 14:05

## 2019-12-27 RX ADMIN — FAMOTIDINE 20 MG: 20 TABLET, FILM COATED ORAL at 06:36

## 2019-12-27 RX ADMIN — METRONIDAZOLE 500 MG: 250 TABLET ORAL at 06:36

## 2019-12-27 RX ADMIN — ONDANSETRON 4 MG: 2 INJECTION INTRAMUSCULAR; INTRAVENOUS at 08:57

## 2019-12-27 RX ADMIN — LEVOFLOXACIN 500 MG: 5 INJECTION, SOLUTION INTRAVENOUS at 09:02

## 2019-12-27 RX ADMIN — FAMOTIDINE 20 MG: 20 TABLET, FILM COATED ORAL at 18:06

## 2019-12-27 RX ADMIN — MORPHINE SULFATE 4 MG: 2 INJECTION, SOLUTION INTRAMUSCULAR; INTRAVENOUS at 08:57

## 2019-12-27 RX ADMIN — SODIUM CHLORIDE, PRESERVATIVE FREE 10 ML: 5 INJECTION INTRAVENOUS at 09:01

## 2019-12-27 RX ADMIN — ONDANSETRON 4 MG: 2 INJECTION INTRAMUSCULAR; INTRAVENOUS at 20:44

## 2019-12-27 RX ADMIN — SODIUM CHLORIDE 100 ML/HR: 9 INJECTION, SOLUTION INTRAVENOUS at 03:07

## 2019-12-28 LAB — CALPROTECTIN STL-MCNT: 27 UG/G (ref 0–120)

## 2019-12-28 PROCEDURE — 25010000002 LEVOFLOXACIN PER 250 MG: Performed by: NURSE PRACTITIONER

## 2019-12-28 PROCEDURE — 25010000002 ONDANSETRON PER 1 MG: Performed by: NURSE PRACTITIONER

## 2019-12-28 PROCEDURE — 99231 SBSQ HOSP IP/OBS SF/LOW 25: CPT | Performed by: INTERNAL MEDICINE

## 2019-12-28 PROCEDURE — 25010000002 MORPHINE PER 10 MG: Performed by: NURSE PRACTITIONER

## 2019-12-28 RX ADMIN — METRONIDAZOLE 500 MG: 250 TABLET ORAL at 15:03

## 2019-12-28 RX ADMIN — FAMOTIDINE 20 MG: 20 TABLET, FILM COATED ORAL at 06:48

## 2019-12-28 RX ADMIN — SODIUM CHLORIDE 100 ML/HR: 9 INJECTION, SOLUTION INTRAVENOUS at 23:46

## 2019-12-28 RX ADMIN — FAMOTIDINE 20 MG: 20 TABLET, FILM COATED ORAL at 17:40

## 2019-12-28 RX ADMIN — SERTRALINE 50 MG: 50 TABLET, FILM COATED ORAL at 08:34

## 2019-12-28 RX ADMIN — METRONIDAZOLE 500 MG: 250 TABLET ORAL at 21:39

## 2019-12-28 RX ADMIN — SODIUM CHLORIDE 100 ML/HR: 9 INJECTION, SOLUTION INTRAVENOUS at 01:52

## 2019-12-28 RX ADMIN — SODIUM CHLORIDE, PRESERVATIVE FREE 10 ML: 5 INJECTION INTRAVENOUS at 21:40

## 2019-12-28 RX ADMIN — METRONIDAZOLE 500 MG: 250 TABLET ORAL at 02:08

## 2019-12-28 RX ADMIN — LEVOFLOXACIN 500 MG: 5 INJECTION, SOLUTION INTRAVENOUS at 11:30

## 2019-12-28 RX ADMIN — ONDANSETRON 4 MG: 2 INJECTION INTRAMUSCULAR; INTRAVENOUS at 08:34

## 2019-12-28 RX ADMIN — SODIUM CHLORIDE, PRESERVATIVE FREE 10 ML: 5 INJECTION INTRAVENOUS at 08:34

## 2019-12-28 RX ADMIN — METRONIDAZOLE 500 MG: 250 TABLET ORAL at 06:48

## 2019-12-28 RX ADMIN — MORPHINE SULFATE 4 MG: 2 INJECTION, SOLUTION INTRAMUSCULAR; INTRAVENOUS at 08:34

## 2019-12-28 RX ADMIN — SODIUM CHLORIDE 100 ML/HR: 9 INJECTION, SOLUTION INTRAVENOUS at 11:29

## 2019-12-29 LAB
BACTERIA SPEC AEROBE CULT: NORMAL
BACTERIA SPEC AEROBE CULT: NORMAL

## 2019-12-29 PROCEDURE — 25010000002 LEVOFLOXACIN PER 250 MG: Performed by: NURSE PRACTITIONER

## 2019-12-29 PROCEDURE — 25010000002 ONDANSETRON PER 1 MG: Performed by: NURSE PRACTITIONER

## 2019-12-29 PROCEDURE — 99231 SBSQ HOSP IP/OBS SF/LOW 25: CPT | Performed by: INTERNAL MEDICINE

## 2019-12-29 PROCEDURE — 25010000002 MORPHINE PER 10 MG: Performed by: NURSE PRACTITIONER

## 2019-12-29 RX ADMIN — ONDANSETRON 4 MG: 2 INJECTION INTRAMUSCULAR; INTRAVENOUS at 16:37

## 2019-12-29 RX ADMIN — FAMOTIDINE 20 MG: 20 TABLET, FILM COATED ORAL at 06:32

## 2019-12-29 RX ADMIN — SODIUM CHLORIDE 100 ML/HR: 9 INJECTION, SOLUTION INTRAVENOUS at 20:51

## 2019-12-29 RX ADMIN — MORPHINE SULFATE 4 MG: 2 INJECTION, SOLUTION INTRAMUSCULAR; INTRAVENOUS at 16:37

## 2019-12-29 RX ADMIN — SODIUM CHLORIDE 100 ML/HR: 9 INJECTION, SOLUTION INTRAVENOUS at 09:51

## 2019-12-29 RX ADMIN — SODIUM CHLORIDE, PRESERVATIVE FREE 10 ML: 5 INJECTION INTRAVENOUS at 20:52

## 2019-12-29 RX ADMIN — METRONIDAZOLE 500 MG: 250 TABLET ORAL at 21:22

## 2019-12-29 RX ADMIN — SERTRALINE 50 MG: 50 TABLET, FILM COATED ORAL at 09:53

## 2019-12-29 RX ADMIN — METRONIDAZOLE 500 MG: 250 TABLET ORAL at 06:32

## 2019-12-29 RX ADMIN — LEVOFLOXACIN 500 MG: 5 INJECTION, SOLUTION INTRAVENOUS at 11:45

## 2019-12-29 RX ADMIN — FAMOTIDINE 20 MG: 20 TABLET, FILM COATED ORAL at 18:00

## 2019-12-29 RX ADMIN — METRONIDAZOLE 500 MG: 250 TABLET ORAL at 15:36

## 2019-12-30 ENCOUNTER — TELEPHONE (OUTPATIENT)
Dept: GASTROENTEROLOGY | Facility: CLINIC | Age: 29
End: 2019-12-30

## 2019-12-30 PROCEDURE — 25010000002 ONDANSETRON PER 1 MG: Performed by: NURSE PRACTITIONER

## 2019-12-30 PROCEDURE — 25010000002 MORPHINE PER 10 MG: Performed by: NURSE PRACTITIONER

## 2019-12-30 RX ADMIN — METRONIDAZOLE 500 MG: 250 TABLET ORAL at 06:52

## 2019-12-30 RX ADMIN — METRONIDAZOLE 500 MG: 250 TABLET ORAL at 13:31

## 2019-12-30 RX ADMIN — ONDANSETRON 4 MG: 2 INJECTION INTRAMUSCULAR; INTRAVENOUS at 08:46

## 2019-12-30 RX ADMIN — SODIUM CHLORIDE, PRESERVATIVE FREE 10 ML: 5 INJECTION INTRAVENOUS at 21:12

## 2019-12-30 RX ADMIN — SERTRALINE 50 MG: 50 TABLET, FILM COATED ORAL at 08:46

## 2019-12-30 RX ADMIN — SODIUM CHLORIDE 100 ML/HR: 9 INJECTION, SOLUTION INTRAVENOUS at 06:54

## 2019-12-30 RX ADMIN — FAMOTIDINE 20 MG: 20 TABLET, FILM COATED ORAL at 16:38

## 2019-12-30 RX ADMIN — FAMOTIDINE 20 MG: 20 TABLET, FILM COATED ORAL at 06:53

## 2019-12-30 RX ADMIN — MORPHINE SULFATE 4 MG: 2 INJECTION, SOLUTION INTRAMUSCULAR; INTRAVENOUS at 08:46

## 2019-12-30 RX ADMIN — METRONIDAZOLE 500 MG: 250 TABLET ORAL at 21:11

## 2019-12-30 NOTE — TELEPHONE ENCOUNTER
"No hipaa on file.      Call to mother, Michelle.  Advise that no hipaa on file, therefore - with apologies - cannot discuss case.  Jeni verb understanding.  Tearful - states very worried that pt being prepared for d/c and \"nothing better\".  Asking if Dr Muse can see pt today.    Message to Dr Muse   "

## 2019-12-30 NOTE — TELEPHONE ENCOUNTER
----- Message from Lorin Ceballos sent at 12/30/2019 12:08 PM EST -----  Regarding: talk to nurse  Contact: [phone]  Pt mom Michelle is wanting to talk to nurse, she said pt is in the hospital and she wants to know what is going on, I advised her to talk to her nurse at the hospital but she wanted me to send a message because Almaz and Aldair has been seeing her at the hospital.

## 2019-12-31 VITALS
RESPIRATION RATE: 18 BRPM | TEMPERATURE: 98.4 F | DIASTOLIC BLOOD PRESSURE: 94 MMHG | WEIGHT: 160.5 LBS | OXYGEN SATURATION: 99 % | HEIGHT: 65 IN | BODY MASS INDEX: 26.74 KG/M2 | HEART RATE: 75 BPM | SYSTOLIC BLOOD PRESSURE: 133 MMHG

## 2019-12-31 PROCEDURE — 25010000002 MORPHINE PER 10 MG: Performed by: NURSE PRACTITIONER

## 2019-12-31 PROCEDURE — 25010000002 ONDANSETRON PER 1 MG: Performed by: NURSE PRACTITIONER

## 2019-12-31 RX ORDER — METRONIDAZOLE 500 MG/1
500 TABLET ORAL EVERY 8 HOURS SCHEDULED
Qty: 21 TABLET | Refills: 0 | Status: SHIPPED | OUTPATIENT
Start: 2019-12-31 | End: 2020-01-07

## 2019-12-31 RX ORDER — LEVOFLOXACIN 500 MG/1
500 TABLET, FILM COATED ORAL DAILY
Qty: 7 TABLET | Refills: 0 | Status: SHIPPED | OUTPATIENT
Start: 2019-12-31 | End: 2020-02-05

## 2019-12-31 RX ADMIN — SODIUM CHLORIDE 100 ML/HR: 9 INJECTION, SOLUTION INTRAVENOUS at 01:26

## 2019-12-31 RX ADMIN — MORPHINE SULFATE 4 MG: 2 INJECTION, SOLUTION INTRAMUSCULAR; INTRAVENOUS at 07:58

## 2019-12-31 RX ADMIN — SODIUM CHLORIDE, PRESERVATIVE FREE 10 ML: 5 INJECTION INTRAVENOUS at 08:03

## 2019-12-31 RX ADMIN — SERTRALINE 50 MG: 50 TABLET, FILM COATED ORAL at 08:02

## 2019-12-31 RX ADMIN — METRONIDAZOLE 500 MG: 250 TABLET ORAL at 06:48

## 2019-12-31 RX ADMIN — ONDANSETRON 4 MG: 2 INJECTION INTRAMUSCULAR; INTRAVENOUS at 07:58

## 2019-12-31 RX ADMIN — FAMOTIDINE 20 MG: 20 TABLET, FILM COATED ORAL at 06:48

## 2020-01-01 ENCOUNTER — READMISSION MANAGEMENT (OUTPATIENT)
Dept: CALL CENTER | Facility: HOSPITAL | Age: 30
End: 2020-01-01

## 2020-01-01 NOTE — OUTREACH NOTE
Prep Survey      Responses   Facility patient discharged from?  [facility]   Is patient eligible?  Yes   Discharge diagnosis  nausea and vomiting,  Proctocolitis    Does the patient have one of the following disease processes/diagnoses(primary or secondary)?  Other   Does the patient have Home health ordered?  No   Is there a DME ordered?  No   Prep survey completed?  Yes          Eloina Reis RN

## 2020-01-02 NOTE — PAYOR COMM NOTE
"ATTN: Passport Nurse Reviewer   REF: [id number]  RE: Discharge Notification     Lilibeth Bruce  Utilization Review/Room Reservations  Phone: Rebecca Nguyen: [phone], Lilibeth: [phone]  Fax: [phone]  Email: [email or web address]  Please call, fax back, or email with authorization or any questions! Thanks!    Deric Panchal (29 y.o. Female)     Date of Birth Social Security Number Address Home Phone MRN    1990  [id number] [facility] [address] [phone] [id number]    Latter day Marital Status          None Single       Admission Date Admission Type Admitting Provider Attending Provider Department, Room/Bed    12/24/19 Emergency Deandre Alonso MD  [facility] [address] [place], P399/1    Discharge Date Discharge Disposition Discharge Destination        12/31/2019 Home or Self Care              Attending Provider:  (none)   Allergies:  No Known Allergies    Isolation:  None   Infection:  None   Code Status:  Prior    Ht:  164.6 cm (64.8\")   Wt:  72.8 kg (160 lb 8 oz)    Admission Cmt:  None   Principal Problem:  Proctocolitis [K52.9]                 Active Insurance as of 12/24/2019     Primary Coverage     Payor Plan Insurance Group Employer/Plan Group    [facility] [id number]     Payor Plan Address Payor Plan Phone Number Payor Plan Fax Number Effective Dates    [address] [phone]  10/1/2015 - None Entered    [facility] [address]       Subscriber Name Subscriber Birth Date Member ID       DERIC PANCHAL 1990 [id number]                 Emergency Contacts      (Rel.) Home Phone Work Phone Mobile Phone    Michelle Levy (Mother) [phone] -- --               Discharge Summary      Manish Rothman MD at 12/31/19 [address] [place] [address]    Date of Discharge:  12/31/2019    PCP: Pau Romeo MD    Discharge Diagnosis:   Active Hospital Problems    Diagnosis  POA   • **Proctocolitis " [K52.9]  Yes   • Macrocytosis [D75.89]  Yes   • Chronic abdominal pain [R10.9, G89.29]  Yes   • Tetrahydrocannabinol (THC) use disorder, mild, abuse [F12.10]  Yes   • Hematochezia [K92.1]  Yes      Resolved Hospital Problems    Diagnosis Date Resolved POA   • Leukocytosis [D72.829] 12/25/2019 Yes     Procedures Performed       Consults     Date and Time Order Name Status Description    12/24/2019 1231 Inpatient Gastroenterology Consult Completed     12/24/2019 1027 [facility] (on-call MD unless specified) Details Completed         Hospital Course  This is a 29-year-old female who was admitted to hospitalist service with main complaints of nausea and vomiting.  Patient upon further evaluation was found to have clinical diagnosis of colitis.  She was evaluated by GI service.  Patient was started on IV antibiotics and she has been treated with Flagyl therapy as well.  Cultures have been unremarkable.  She has been evaluated by GI on daily basis, since she is tolerating p.o. diet well.  Patient is deemed stable to be discharged.  She will closely follow-up with PCP and GI on outpatient basis.  I have seen and examined patient by the bedside today on the day of discharge and total time spent is more than 30 minutes.  Plan of care was discussed with the patient and she has verbalized understanding.    Condition on Discharge: Improved.     Temp:  [97.4 °F (36.3 °C)-98.4 °F (36.9 °C)] 98.4 °F (36.9 °C)  Heart Rate:  [67-86] 75  Resp:  [16-18] 18  BP: (117-133)/(78-94) 133/94  Body mass index is 26.87 kg/m².    Physical Exam   Constitutional: She is oriented to person, place, and time.  Non-toxic appearance.   Cardiovascular: Normal rate and regular rhythm.   No murmur heard.  Pulmonary/Chest: Effort normal and breath sounds normal.   Abdominal: Soft. Bowel sounds are normal. She exhibits no distension. There is no tenderness.   Musculoskeletal: Normal range of motion. She exhibits no edema.   Neurological: She is alert and  oriented to person, place, and time.   Skin: Skin is warm and dry. She is not diaphoretic.       Discharge Medications      New Medications      Instructions Start Date   levoFLOXacin 500 MG tablet  Commonly known as:  LEVAQUIN   500 mg, Oral, Daily      metroNIDAZOLE 500 MG tablet  Commonly known as:  FLAGYL   500 mg, Oral, Every 8 Hours Scheduled         Continue These Medications      Instructions Start Date   hyoscyamine 0.375 MG 12 hr tablet  Commonly known as:  LEVBID   0.375 mg, Oral, Every 12 Hours PRN      sertraline 50 MG tablet  Commonly known as:  ZOLOFT   50 mg, Oral, Daily              Additional Instructions for the Follow-ups that You Need to Schedule     Discharge Follow-up with PCP   As directed       Currently Documented PCP:    Pau Romoe MD    PCP Phone Number:    [phone]     Follow Up Details:  Follow-up with PCP in 7 days.           Follow-up Information     Beni Gonzalez MD .    Specialty:  Gastroenterology  Contact information:  [address]  [facility] [address]  [phone]             Pau Romeo MD .    Specialty:  Family Medicine  Why:  Follow-up with PCP in 7 days.  Contact information:  [address]  [facility] [id number] [phone]                 Test Results Pending at Discharge     Manish Rothman MD  12/31/19  2:01 PM    Discharge time spent greater than 30 minutes.    Electronically signed by Manish Rothman MD at 12/31/19 1881

## 2020-01-05 ENCOUNTER — READMISSION MANAGEMENT (OUTPATIENT)
Dept: CALL CENTER | Facility: HOSPITAL | Age: 30
End: 2020-01-05

## 2020-01-05 NOTE — OUTREACH NOTE
Medical Week 1 Survey      Responses   Facility patient discharged from?  [facility]   Does the patient have one of the following disease processes/diagnoses(primary or secondary)?  Other   Is there a successful TCM telephone encounter documented?  No   Week 1 attempt successful?  No   Unsuccessful attempts  Attempt 1          Paulina Wagoner RN

## 2020-01-08 ENCOUNTER — READMISSION MANAGEMENT (OUTPATIENT)
Dept: CALL CENTER | Facility: HOSPITAL | Age: 30
End: 2020-01-08

## 2020-01-08 NOTE — OUTREACH NOTE
Medical Week 1 Survey      Responses   Facility patient discharged from?  [facility]   Does the patient have one of the following disease processes/diagnoses(primary or secondary)?  Other   Is there a successful TCM telephone encounter documented?  No   Week 1 attempt successful?  Yes   Call start time  0902   Rescheduled  Rescheduled-pt requested [Mother unable to talk today. Requested callback. ]   Call end time  0902          Kindra Vega RN

## 2020-01-10 ENCOUNTER — READMISSION MANAGEMENT (OUTPATIENT)
Dept: CALL CENTER | Facility: HOSPITAL | Age: 30
End: 2020-01-10

## 2020-01-10 NOTE — OUTREACH NOTE
Medical Week 1 Survey      Responses   Facility patient discharged from?  [facility]   Does the patient have one of the following disease processes/diagnoses(primary or secondary)?  Other   Is there a successful TCM telephone encounter documented?  No   Week 1 attempt successful?  No   Rescheduled  Revoked          Jessi Wolf RN

## 2020-02-05 ENCOUNTER — OFFICE VISIT (OUTPATIENT)
Dept: GASTROENTEROLOGY | Facility: CLINIC | Age: 30
End: 2020-02-05

## 2020-02-05 VITALS
WEIGHT: 162.6 LBS | DIASTOLIC BLOOD PRESSURE: 70 MMHG | HEIGHT: 65 IN | BODY MASS INDEX: 27.09 KG/M2 | SYSTOLIC BLOOD PRESSURE: 102 MMHG | TEMPERATURE: 98.2 F

## 2020-02-05 DIAGNOSIS — R10.12 LUQ PAIN: Primary | ICD-10-CM

## 2020-02-05 DIAGNOSIS — A04.8 H. PYLORI INFECTION: ICD-10-CM

## 2020-02-05 DIAGNOSIS — R11.2 NON-INTRACTABLE VOMITING WITH NAUSEA, UNSPECIFIED VOMITING TYPE: ICD-10-CM

## 2020-02-05 PROCEDURE — 99213 OFFICE O/P EST LOW 20 MIN: CPT | Performed by: INTERNAL MEDICINE

## 2020-02-05 RX ORDER — CLARITHROMYCIN 500 MG/1
500 TABLET, COATED ORAL 2 TIMES DAILY
Qty: 28 TABLET | Refills: 0 | Status: SHIPPED | OUTPATIENT
Start: 2020-02-05 | End: 2020-07-01

## 2020-02-05 RX ORDER — DICYCLOMINE HCL 20 MG
20 TABLET ORAL
Qty: 30 TABLET | Refills: 5 | Status: SHIPPED | OUTPATIENT
Start: 2020-02-05 | End: 2020-03-06

## 2020-02-05 RX ORDER — BISMUTH SUBSALICYLATE 262 MG
524 TABLET,CHEWABLE ORAL 4 TIMES DAILY
Qty: 112 TABLET | Refills: 0 | Status: SHIPPED | OUTPATIENT
Start: 2020-02-05 | End: 2020-07-01

## 2020-02-05 RX ORDER — AMOXICILLIN 500 MG/1
1000 CAPSULE ORAL 2 TIMES DAILY
Qty: 56 CAPSULE | Refills: 0 | Status: SHIPPED | OUTPATIENT
Start: 2020-02-05 | End: 2020-07-01

## 2020-02-05 RX ORDER — LANSOPRAZOLE 30 MG/1
30 CAPSULE, DELAYED RELEASE ORAL 2 TIMES DAILY
Qty: 28 CAPSULE | Refills: 0 | Status: SHIPPED | OUTPATIENT
Start: 2020-02-05 | End: 2020-02-19

## 2020-02-05 NOTE — PROGRESS NOTES
Subjective   Chief Complaint   Patient presents with   • Abdominal Pain       Dionna Panchal is a  29 y.o. female here for an initial visit for abdominal pain.  She was seen by me previously in the hospital in December.    Patient was previously evaluated by Dr. Barrett.  She underwent an EGD and colonoscopy in April 2019.  She had normal colon biopsies at that time.  Gastric biopsies reflected H. pylori.  She was hospitalized in December 2019 with colitis.  This was reflected on a CT scan.    She reports that she progressively improved after hospitalization in December.  Her bowel movements have been normal since then which is a significant improvement from last year.  She reports that the day prior to her.  She will have nausea and vomiting.  She will have pain in the left upper quadrant associated with diarrhea.  She may vomit food or bile.  The only thing that really gives her any relief is sitting in a hot bath.  She reports that she tried hyoscyamine in the past 0.375 mg but it made her feel weird so she did not take it anymore.  She is not been taking any medication.  In between these episodes she is completely asymptomatic.  She has no heartburn.  She takes no over-the-counter medicines.  HPI  Past Medical History:   Diagnosis Date   • H. pylori infection      Past Surgical History:   Procedure Laterality Date   • COLONOSCOPY  04/30/2019    Dr Barrett   • UPPER GASTROINTESTINAL ENDOSCOPY  04/13/2019       Current Outpatient Medications:   •  sertraline (ZOLOFT) 50 MG tablet, Take 50 mg by mouth Daily., Disp: , Rfl:   •  amoxicillin (AMOXIL) 500 MG capsule, Take 2 capsules by mouth 2 (Two) Times a Day., Disp: 56 capsule, Rfl: 0  •  Bismuth 262 MG chewable tablet, Chew 2 tablets 4 (Four) Times a Day., Disp: 112 tablet, Rfl: 0  •  clarithromycin (BIAXIN) 500 MG tablet, Take 1 tablet by mouth 2 (Two) Times a Day., Disp: 28 tablet, Rfl: 0  •  dicyclomine (BENTYL) 20 MG tablet, Take 1 tablet by mouth 4 (Four) Times  a Day Before Meals & at Bedtime As Needed (cramping) for up to 30 days., Disp: 30 tablet, Rfl: 5  •  lansoprazole (PREVACID) 30 MG capsule, Take 1 capsule by mouth 2 (Two) Times a Day for 14 days., Disp: 28 capsule, Rfl: 0  PRN Meds:.  No Known Allergies  Social History     Socioeconomic History   • Marital status: Single     Spouse name: Not on file   • Number of children: Not on file   • Years of education: Not on file   • Highest education level: Not on file   Tobacco Use   • Smoking status: Never Smoker   • Smokeless tobacco: Never Used   Substance and Sexual Activity   • Alcohol use: Never     Frequency: Never     History reviewed. No pertinent family history.  Review of Systems   Constitutional: Negative for appetite change and unexpected weight change.   Gastrointestinal: Positive for abdominal pain, diarrhea, nausea and vomiting.   All other systems reviewed and are negative.    Vitals:    02/05/20 1435   BP: 102/70   Temp: 98.2 °F (36.8 °C)         02/05/20  1435   Weight: 73.8 kg (162 lb 9.6 oz)       Objective   Physical Exam   Constitutional: She appears well-developed and well-nourished.   HENT:   Head: Normocephalic and atraumatic.   Eyes: No scleral icterus.   Abdominal: Soft. She exhibits no distension and no mass. There is no tenderness.   Neurological: She is alert.   Skin: Skin is warm and dry.   Psychiatric: She has a normal mood and affect.     No radiology results for the last 7 days    Assessment/Plan   Diagnoses and all orders for this visit:    LUQ pain    Non-intractable vomiting with nausea, unspecified vomiting type    H. pylori infection    Other orders  -     amoxicillin (AMOXIL) 500 MG capsule; Take 2 capsules by mouth 2 (Two) Times a Day.  -     clarithromycin (BIAXIN) 500 MG tablet; Take 1 tablet by mouth 2 (Two) Times a Day.  -     lansoprazole (PREVACID) 30 MG capsule; Take 1 capsule by mouth 2 (Two) Times a Day for 14 days.  -     Bismuth 262 MG chewable tablet; Chew 2 tablets 4  (Four) Times a Day.  -     dicyclomine (BENTYL) 20 MG tablet; Take 1 tablet by mouth 4 (Four) Times a Day Before Meals & at Bedtime As Needed (cramping) for up to 30 days.      Plan:  · We will plan on treating her H. pylori first and then following up on her symptoms to see if they resolve.  Have sent medication for H. pylori to her pharmacy and discussed with her how to take it.  · She will need a breath test in 8 weeks at her follow-up to make sure that H. pylori has been eradicated  · Have sent Bentyl to her pharmacy to use as needed.  Perhaps the extended relief hyoscyamine was too strong for her.  · She is currently having normal bowel movements  · Further recommendations based upon her clinical course

## 2020-02-07 ENCOUNTER — PRIOR AUTHORIZATION (OUTPATIENT)
Dept: GASTROENTEROLOGY | Facility: CLINIC | Age: 30
End: 2020-02-07

## 2020-02-10 ENCOUNTER — PRIOR AUTHORIZATION (OUTPATIENT)
Dept: GASTROENTEROLOGY | Facility: CLINIC | Age: 30
End: 2020-02-10

## 2020-02-10 NOTE — TELEPHONE ENCOUNTER
PA in process for bismuth, two tablets four times a day for 14 days for treatment of H pylori (along with 3 other meds).

## 2020-02-12 NOTE — TELEPHONE ENCOUNTER
Ariella Lau MA [facility] Clinical 1 Pool 2 days ago      PA for Lansoprazole denied 02/07/2020   To be scanned into Media tab    Routing comment        Ariella Lau MA [facility] Clinical 1 Pool 5 days ago      PA in process for Lansoprazole 30 mg bid dosing thru [facility]      Awaiting letter - message to Ariella ARANA

## 2020-03-15 ENCOUNTER — HOSPITAL ENCOUNTER (EMERGENCY)
Facility: HOSPITAL | Age: 30
Discharge: HOME | End: 2020-03-15
Attending: EMERGENCY MEDICINE | Admitting: EMERGENCY MEDICINE

## 2020-03-15 VITALS
HEIGHT: 65 IN | RESPIRATION RATE: 14 BRPM | OXYGEN SATURATION: 98 % | TEMPERATURE: 99.1 F | BODY MASS INDEX: 27.49 KG/M2 | WEIGHT: 165 LBS | HEART RATE: 83 BPM | SYSTOLIC BLOOD PRESSURE: 115 MMHG | DIASTOLIC BLOOD PRESSURE: 75 MMHG

## 2020-03-15 DIAGNOSIS — R19.7 NAUSEA VOMITING AND DIARRHEA: Primary | ICD-10-CM

## 2020-03-15 DIAGNOSIS — R11.2 NAUSEA VOMITING AND DIARRHEA: Primary | ICD-10-CM

## 2020-03-15 LAB
ALBUMIN SERPL-MCNC: 5 G/DL (ref 3.5–5.2)
ALBUMIN/GLOB SERPL: 1.7 G/DL
ALP SERPL-CCNC: 62 U/L (ref 39–117)
ALT SERPL W P-5'-P-CCNC: 18 U/L (ref 1–33)
ANION GAP SERPL CALCULATED.3IONS-SCNC: 16.7 MMOL/L (ref 5–15)
AST SERPL-CCNC: 26 U/L (ref 1–32)
BASOPHILS # BLD AUTO: 0.05 10*3/MM3 (ref 0–0.2)
BASOPHILS NFR BLD AUTO: 0.5 % (ref 0–1.5)
BILIRUB SERPL-MCNC: 0.2 MG/DL (ref 0.2–1.2)
BILIRUB UR QL STRIP: NEGATIVE
BUN BLD-MCNC: 7 MG/DL (ref 6–20)
BUN/CREAT SERPL: 10.1 (ref 7–25)
CALCIUM SPEC-SCNC: 8.7 MG/DL (ref 8.6–10.5)
CHLORIDE SERPL-SCNC: 103 MMOL/L (ref 98–107)
CLARITY UR: CLEAR
CO2 SERPL-SCNC: 22.3 MMOL/L (ref 22–29)
COLOR UR: YELLOW
CREAT BLD-MCNC: 0.69 MG/DL (ref 0.57–1)
DEPRECATED RDW RBC AUTO: 46.9 FL (ref 37–54)
EOSINOPHIL # BLD AUTO: 0.03 10*3/MM3 (ref 0–0.4)
EOSINOPHIL NFR BLD AUTO: 0.3 % (ref 0.3–6.2)
ERYTHROCYTE [DISTWIDTH] IN BLOOD BY AUTOMATED COUNT: 13.1 % (ref 12.3–15.4)
GFR SERPL CREATININE-BSD FRML MDRD: 101 ML/MIN/1.73
GLOBULIN UR ELPH-MCNC: 3 GM/DL
GLUCOSE BLD-MCNC: 107 MG/DL (ref 65–99)
GLUCOSE UR STRIP-MCNC: NEGATIVE MG/DL
HCG SERPL QL: NEGATIVE
HCT VFR BLD AUTO: 40.8 % (ref 34–46.6)
HGB BLD-MCNC: 13.9 G/DL (ref 12–15.9)
HGB UR QL STRIP.AUTO: NEGATIVE
HOLD SPECIMEN: NORMAL
HOLD SPECIMEN: NORMAL
IMM GRANULOCYTES # BLD AUTO: 0.05 10*3/MM3 (ref 0–0.05)
IMM GRANULOCYTES NFR BLD AUTO: 0.5 % (ref 0–0.5)
KETONES UR QL STRIP: NEGATIVE
LEUKOCYTE ESTERASE UR QL STRIP.AUTO: NEGATIVE
LIPASE SERPL-CCNC: 17 U/L (ref 13–60)
LYMPHOCYTES # BLD AUTO: 0.84 10*3/MM3 (ref 0.7–3.1)
LYMPHOCYTES NFR BLD AUTO: 9.2 % (ref 19.6–45.3)
MCH RBC QN AUTO: 33 PG (ref 26.6–33)
MCHC RBC AUTO-ENTMCNC: 34.1 G/DL (ref 31.5–35.7)
MCV RBC AUTO: 96.9 FL (ref 79–97)
MONOCYTES # BLD AUTO: 0.63 10*3/MM3 (ref 0.1–0.9)
MONOCYTES NFR BLD AUTO: 6.9 % (ref 5–12)
NEUTROPHILS # BLD AUTO: 7.54 10*3/MM3 (ref 1.7–7)
NEUTROPHILS NFR BLD AUTO: 82.6 % (ref 42.7–76)
NITRITE UR QL STRIP: NEGATIVE
NRBC BLD AUTO-RTO: 0 /100 WBC (ref 0–0.2)
PH UR STRIP.AUTO: >=9 [PH] (ref 5–8)
PLATELET # BLD AUTO: 234 10*3/MM3 (ref 140–450)
PMV BLD AUTO: 9.7 FL (ref 6–12)
POTASSIUM BLD-SCNC: 3.6 MMOL/L (ref 3.5–5.2)
PROT SERPL-MCNC: 8 G/DL (ref 6–8.5)
PROT UR QL STRIP: ABNORMAL
RBC # BLD AUTO: 4.21 10*6/MM3 (ref 3.77–5.28)
SODIUM BLD-SCNC: 142 MMOL/L (ref 136–145)
SP GR UR STRIP: 1.02 (ref 1–1.03)
UROBILINOGEN UR QL STRIP: ABNORMAL
WBC NRBC COR # BLD: 9.14 10*3/MM3 (ref 3.4–10.8)
WHOLE BLOOD HOLD SPECIMEN: NORMAL
WHOLE BLOOD HOLD SPECIMEN: NORMAL

## 2020-03-15 PROCEDURE — 99284 EMERGENCY DEPT VISIT MOD MDM: CPT

## 2020-03-15 PROCEDURE — 25010000002 ONDANSETRON PER 1 MG: Performed by: EMERGENCY MEDICINE

## 2020-03-15 PROCEDURE — 96374 THER/PROPH/DIAG INJ IV PUSH: CPT

## 2020-03-15 PROCEDURE — 85025 COMPLETE CBC W/AUTO DIFF WBC: CPT | Performed by: EMERGENCY MEDICINE

## 2020-03-15 PROCEDURE — 81003 URINALYSIS AUTO W/O SCOPE: CPT | Performed by: EMERGENCY MEDICINE

## 2020-03-15 PROCEDURE — 96375 TX/PRO/DX INJ NEW DRUG ADDON: CPT

## 2020-03-15 PROCEDURE — 25010000002 MORPHINE PER 10 MG: Performed by: EMERGENCY MEDICINE

## 2020-03-15 PROCEDURE — 83690 ASSAY OF LIPASE: CPT | Performed by: EMERGENCY MEDICINE

## 2020-03-15 PROCEDURE — 84703 CHORIONIC GONADOTROPIN ASSAY: CPT | Performed by: EMERGENCY MEDICINE

## 2020-03-15 PROCEDURE — 80053 COMPREHEN METABOLIC PANEL: CPT | Performed by: EMERGENCY MEDICINE

## 2020-03-15 RX ORDER — MORPHINE SULFATE 2 MG/ML
4 INJECTION, SOLUTION INTRAMUSCULAR; INTRAVENOUS ONCE
Status: COMPLETED | OUTPATIENT
Start: 2020-03-15 | End: 2020-03-15

## 2020-03-15 RX ORDER — FAMOTIDINE 10 MG/ML
20 INJECTION, SOLUTION INTRAVENOUS ONCE
Status: COMPLETED | OUTPATIENT
Start: 2020-03-15 | End: 2020-03-15

## 2020-03-15 RX ORDER — ONDANSETRON 4 MG/1
TABLET, FILM COATED ORAL
COMMUNITY
Start: 2020-01-06 | End: 2020-07-01

## 2020-03-15 RX ORDER — SODIUM CHLORIDE 0.9 % (FLUSH) 0.9 %
10 SYRINGE (ML) INJECTION AS NEEDED
Status: DISCONTINUED | OUTPATIENT
Start: 2020-03-15 | End: 2020-03-15 | Stop reason: HOSPADM

## 2020-03-15 RX ORDER — DICYCLOMINE HYDROCHLORIDE 10 MG/1
CAPSULE ORAL
COMMUNITY
Start: 2020-01-06 | End: 2020-07-01

## 2020-03-15 RX ORDER — DICYCLOMINE HYDROCHLORIDE 10 MG/1
10 CAPSULE ORAL 4 TIMES DAILY
COMMUNITY
Start: 2020-01-06 | End: 2020-07-01 | Stop reason: SDUPTHER

## 2020-03-15 RX ORDER — PROMETHAZINE HYDROCHLORIDE 25 MG/1
SUPPOSITORY RECTAL
Qty: 15 SUPPOSITORY | Refills: 0 | Status: SHIPPED | OUTPATIENT
Start: 2020-03-15 | End: 2021-02-22

## 2020-03-15 RX ORDER — ONDANSETRON 2 MG/ML
4 INJECTION INTRAMUSCULAR; INTRAVENOUS ONCE
Status: COMPLETED | OUTPATIENT
Start: 2020-03-15 | End: 2020-03-15

## 2020-03-15 RX ADMIN — FAMOTIDINE 20 MG: 10 INJECTION, SOLUTION INTRAVENOUS at 12:38

## 2020-03-15 RX ADMIN — ONDANSETRON 4 MG: 2 INJECTION INTRAMUSCULAR; INTRAVENOUS at 12:38

## 2020-03-15 RX ADMIN — SODIUM CHLORIDE, POTASSIUM CHLORIDE, SODIUM LACTATE AND CALCIUM CHLORIDE 2000 ML: 600; 310; 30; 20 INJECTION, SOLUTION INTRAVENOUS at 12:38

## 2020-03-15 RX ADMIN — MORPHINE SULFATE 4 MG: 2 INJECTION, SOLUTION INTRAMUSCULAR; INTRAVENOUS at 12:39

## 2020-03-15 NOTE — ED NOTES
"Pt to er due to \"colitis flare\" .  Diarrhea, abd pain.     Cuauhtemoc Knight, RN  03/15/20 1212    "

## 2020-03-15 NOTE — ED PROVIDER NOTES
" EMERGENCY DEPARTMENT ENCOUNTER    Room Number:  32/32  Date of encounter:  3/15/2020  PCP: Pau Romeo MD  Historian: Patient    HPI:  Chief Complaint: N/V/D  A complete HPI/ROS/PMH/PSH/SH/FH are unobtainable due to: N/A   Context: Dionna Panchal is a 29 y.o. female who presents to the ED c/o gradual onset of watery diarrhea associated with nausea, non-bloody, non-bilious vomiting, RUQ pain that started last night which has been progressively worsening since onset. She denies blood in stool or urinary sxs. Current sxs have no aggravated or alleviating factors. She denies having any triggering factors. She denies taking any medications for her current sxs PTA. She is also complaining of having \"flu symptoms\" such as sore throat, coughing, and rhinorrhea. She denies fever or chills or SOA. Pt reports recent hx of H. Pylori infection and colitis. Pt denies PSHx of GIT. Mother at bedside.    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 pylori infection          PAST SURGICAL HISTORY  Past Surgical History:   Procedure Laterality Date   • COLONOSCOPY  04/30/2019    Dr Barrett   • UPPER GASTROINTESTINAL ENDOSCOPY  04/13/2019         FAMILY HISTORY  History reviewed. No pertinent family history.      SOCIAL HISTORY  Social History     Socioeconomic History   • Marital status: Single     Spouse name: Not on file   • Number of children: Not on file   • Years of education: Not on file   • Highest education level: Not on file   Tobacco Use   • Smoking status: Never Smoker   • Smokeless tobacco: Never Used   Substance and Sexual Activity   • Alcohol use: Never     Frequency: " Never   • Drug use: Never   • Sexual activity: Defer         ALLERGIES  Patient has no known allergies.        REVIEW OF SYSTEMS  Review of Systems   Constitutional: Negative for chills and fever.   HENT: Positive for rhinorrhea and sore throat.    Eyes: Negative.    Respiratory: Positive for cough. Negative for shortness of breath.    Cardiovascular: Negative for chest pain.   Gastrointestinal: Positive for abdominal pain (RUQ), diarrhea (watery), nausea and vomiting (non-bilious, non-bloody). Negative for blood in stool.   Genitourinary: Negative.  Negative for dysuria.   Musculoskeletal: Negative for neck pain.   Skin: Negative for rash.   Allergic/Immunologic: Negative.    Neurological: Negative for weakness, numbness and headaches.   Hematological: Negative.    Psychiatric/Behavioral: Negative.    All other systems reviewed and are negative.       All systems reviewed and negative except for those discussed in HPI.       PHYSICAL EXAM    I have reviewed the triage vital signs and nursing notes.    ED Triage Vitals   Temp Heart Rate Resp BP SpO2   03/15/20 1212 03/15/20 1212 03/15/20 1212 03/15/20 1219 03/15/20 1212   99.1 °F (37.3 °C) 83 16 120/65 92 %      Temp src Heart Rate Source Patient Position BP Location FiO2 (%)   -- -- -- -- --            Physical Exam   Constitutional: No distress.   HENT:  Head: Normocephalic and atraumatic.   Oropharynx: Mucous membranes are moist.   Eyes: No scleral icterus. No conjunctival pallor.  Neck: Painless range of motion noted. Neck supple.   Cardiovascular: Normal rate, regular rhythm and intact distal pulses.  Pulmonary/Chest: No respiratory distress. There are no wheezes, no rhonchi, and no rales.   Abdominal: Soft. There is mild diffuse discomfort. There is no rebound and no guarding. There are no peritoneal irritation findings.  Musculoskeletal: Moves all extremities equally. There is no pedal edema or calf tenderness.   Neurological: Alert.  Baseline strength and  sensation noted.   Skin: Skin is pink, warm, and dry. No pallor.   Psychiatric: Mood and affect normal.   Nursing note and vitals reviewed.    LAB RESULTS  Recent Results (from the past 24 hour(s))   Comprehensive Metabolic Panel    Collection Time: 03/15/20 12:30 PM   Result Value Ref Range    Glucose 107 (H) 65 - 99 mg/dL    BUN 7 6 - 20 mg/dL    Creatinine 0.69 0.57 - 1.00 mg/dL    Sodium 142 136 - 145 mmol/L    Potassium 3.6 3.5 - 5.2 mmol/L    Chloride 103 98 - 107 mmol/L    CO2 22.3 22.0 - 29.0 mmol/L    Calcium 8.7 8.6 - 10.5 mg/dL    Total Protein 8.0 6.0 - 8.5 g/dL    Albumin 5.00 3.50 - 5.20 g/dL    ALT (SGPT) 18 1 - 33 U/L    AST (SGOT) 26 1 - 32 U/L    Alkaline Phosphatase 62 39 - 117 U/L    Total Bilirubin 0.2 0.2 - 1.2 mg/dL    eGFR Non African Amer 101 >60 mL/min/1.73    Globulin 3.0 gm/dL    A/G Ratio 1.7 g/dL    BUN/Creatinine Ratio 10.1 7.0 - 25.0    Anion Gap 16.7 (H) 5.0 - 15.0 mmol/L   Lipase    Collection Time: 03/15/20 12:30 PM   Result Value Ref Range    Lipase 17 13 - 60 U/L   CBC Auto Differential    Collection Time: 03/15/20 12:30 PM   Result Value Ref Range    WBC 9.14 3.40 - 10.80 10*3/mm3    RBC 4.21 3.77 - 5.28 10*6/mm3    Hemoglobin 13.9 12.0 - 15.9 g/dL    Hematocrit 40.8 34.0 - 46.6 %    MCV 96.9 79.0 - 97.0 fL    MCH 33.0 26.6 - 33.0 pg    MCHC 34.1 31.5 - 35.7 g/dL    RDW 13.1 12.3 - 15.4 %    RDW-SD 46.9 37.0 - 54.0 fl    MPV 9.7 6.0 - 12.0 fL    Platelets 234 140 - 450 10*3/mm3    Neutrophil % 82.6 (H) 42.7 - 76.0 %    Lymphocyte % 9.2 (L) 19.6 - 45.3 %    Monocyte % 6.9 5.0 - 12.0 %    Eosinophil % 0.3 0.3 - 6.2 %    Basophil % 0.5 0.0 - 1.5 %    Immature Grans % 0.5 0.0 - 0.5 %    Neutrophils, Absolute 7.54 (H) 1.70 - 7.00 10*3/mm3    Lymphocytes, Absolute 0.84 0.70 - 3.10 10*3/mm3    Monocytes, Absolute 0.63 0.10 - 0.90 10*3/mm3    Eosinophils, Absolute 0.03 0.00 - 0.40 10*3/mm3    Basophils, Absolute 0.05 0.00 - 0.20 10*3/mm3    Immature Grans, Absolute 0.05 0.00 - 0.05  10*3/mm3    nRBC 0.0 0.0 - 0.2 /100 WBC   Light Blue Top    Collection Time: 03/15/20 12:30 PM   Result Value Ref Range    Extra Tube hold for add-on    Green Top (Gel)    Collection Time: 03/15/20 12:30 PM   Result Value Ref Range    Extra Tube Hold for add-ons.    Lavender Top    Collection Time: 03/15/20 12:30 PM   Result Value Ref Range    Extra Tube hold for add-on    Gold Top - SST    Collection Time: 03/15/20 12:30 PM   Result Value Ref Range    Extra Tube Hold for add-ons.    hCG, Serum, Qualitative    Collection Time: 03/15/20 12:30 PM   Result Value Ref Range    HCG Qualitative Negative Negative   Urinalysis With Microscopic If Indicated (No Culture) - Urine, Clean Catch    Collection Time: 03/15/20  2:28 PM   Result Value Ref Range    Color, UA Yellow Yellow, Straw    Appearance, UA Clear Clear    pH, UA >=9.0 (H) 5.0 - 8.0    Specific Gravity, UA 1.020 1.005 - 1.030    Glucose, UA Negative Negative    Ketones, UA Negative Negative    Bilirubin, UA Negative Negative    Blood, UA Negative Negative    Protein, UA Trace (A) Negative    Leuk Esterase, UA Negative Negative    Nitrite, UA Negative Negative    Urobilinogen, UA 0.2 E.U./dL 0.2 - 1.0 E.U./dL       Ordered the above labs and independently reviewed the results.    MEDICATIONS GIVEN IN ER    Medications   sodium chloride 0.9 % flush 10 mL (has no administration in time range)   lactated ringers bolus 2,000 mL (0 mL Intravenous Stopped 3/15/20 1400)   famotidine (PEPCID) injection 20 mg (20 mg Intravenous Given 3/15/20 1238)   morphine injection 4 mg (4 mg Intravenous Given 3/15/20 1239)   ondansetron (ZOFRAN) injection 4 mg (4 mg Intravenous Given 3/15/20 1238)         PROGRESS, DATA ANALYSIS, CONSULTS, AND MEDICAL DECISION MAKING    1229: Ordered labs for further evaluation and management.      1232: Ordered hCG and UA for further evaluation and management.    1233: Ordered Zofran and Pepcid for N/V. Ordered Morphine for pain.    1335: Rechecked pt.  Pt is resting comfortably. Notified pt of her lab work are WNL and nothing acute is noted on lab work requiring further work at this time. Discussed with her that her sxs may be due to a viral illness. Discussed with her at length that her current history and physical exam and lab work up findings do not suggest her current sxs are due to colitis. Discussed the plan to obtain and review UA for further workup to r/o UTI.  Pt understands and agrees with the plan, all questions answered.    1417: Rechecked pt. Pt is resting comfortable. She appears to have improvement of her current sxs. She is able to move around in the ED and is able to provide urine sample for UA. Discussed plan to review UA results to r/o UR. Pt understands and agrees with the plan, all questions answered.    1501: Rechecked pt. Pt is resting comfortably. Notified pt of her UA being negative for UTI. Discussed the plan to discharge the pt home with prescriptions for Phenergan. I instructed the pt to follow up wit her PCP for further evaluation and management. Strict RTER instructions are given to the pt. Pt understands and agrees with the plan, all questions answered.      All labs have been independently reviewed by me. Discussion below represents my analysis of pertinent findings related to patient's condition, differential diagnosis, treatment plan and final disposition.           AS OF 15:31 VITALS:    BP - 115/75  HR - 83  TEMP - 99.1 °F (37.3 °C)  O2 SATS - 98%        DIAGNOSIS  Final diagnoses:   Nausea vomiting and diarrhea         DISPOSITION  DISCHARGE    Patient discharged in stable condition.    Reviewed implications of results, diagnosis, meds, responsibility to follow up, warning signs and symptoms of possible worsening, potential complications and reasons to return to ER.    Patient/Family voiced understanding of above instructions.    Discussed plan for discharge, as there is no emergent indication for admission. Patient referred to  primary care provider for BP management due to today's BP. Pt/family is agreeable and understands need for follow up and repeat testing.  Pt is aware that discharge does not mean that nothing is wrong but it indicates no emergency is present that requires admission and they must continue care with follow-up as given below or physician of their choice.     FOLLOW-UP  Pau Romeo MD  [address]  [facility] [id number] [phone]    Schedule an appointment as soon as possible for a visit       Brinda Muse MD  [address] [facility] [address]  [facility] [address]  [phone]    Call            Medication List      New Prescriptions    promethazine 25 MG suppository  Commonly known as:  PHENERGAN  Insert one suppository into the rectum every 6 (six) hours as needed for   nausea or vomiting.                  Documentation assistance provided by sloan Tinsley for Dr. Rock Finney MD.  Information recorded by the scribe was done at my direction and has been verified and validated by me.             Sun Tinsley  03/15/20 1531       Rock Finney MD  03/17/20 0135

## 2020-03-26 ENCOUNTER — TELEPHONE (OUTPATIENT)
Dept: GASTROENTEROLOGY | Facility: CLINIC | Age: 30
End: 2020-03-26

## 2020-03-26 NOTE — TELEPHONE ENCOUNTER
----- Message from Nessa Matos sent at 3/25/2020  4:21 PM EDT -----  Regarding: call back  Contact: [phone]  Pt states is returning call

## 2020-04-27 ENCOUNTER — TELEPHONE (OUTPATIENT)
Dept: GASTROENTEROLOGY | Facility: CLINIC | Age: 30
End: 2020-04-27

## 2020-04-27 DIAGNOSIS — A04.8 H. PYLORI INFECTION: Primary | ICD-10-CM

## 2020-05-06 ENCOUNTER — TELEPHONE (OUTPATIENT)
Dept: GASTROENTEROLOGY | Facility: CLINIC | Age: 30
End: 2020-05-06

## 2020-05-06 DIAGNOSIS — R10.12 LUQ PAIN: ICD-10-CM

## 2020-05-06 DIAGNOSIS — A04.8 H. PYLORI INFECTION: Primary | ICD-10-CM

## 2020-05-06 DIAGNOSIS — R11.2 NON-INTRACTABLE VOMITING WITH NAUSEA, UNSPECIFIED VOMITING TYPE: ICD-10-CM

## 2020-05-06 NOTE — TELEPHONE ENCOUNTER
----- Message from Dionna Panchal sent at 5/6/2020  2:40 PM EDT -----  Regarding: Prescription Question  Contact: [phone]  I have been really sick, I would appreciate my antibiotics refilled for the second round of medications.

## 2020-05-06 NOTE — TELEPHONE ENCOUNTER
"Called pt and pt reports that she has been feeling bad for a couple of months since the antibx ran out. Pt denies a fever and chills. Pt is having nausea, vomiting, diarrhea and abd pain.   She states she can not hold anything down and \"throws up most of her meals\". She states she needs the antibx refilled.  Advised will send message to Dr Muse.   Pt verb understanding.   "

## 2020-05-07 NOTE — TELEPHONE ENCOUNTER
Called pt and advised of Dr Muse's note. Pt verb understanding and lab appt made for 05/14 at 3p.   Labs ordered.

## 2020-05-07 NOTE — TELEPHONE ENCOUNTER
Patient needs to come have labs and an H. pylori breath test to see if she still has H. pylori.  Looks like this was scheduled but she did not show up.  If the H. pylori test is positive we will retreat with an alternative regimen otherwise need to look for other causes for her symptoms

## 2020-05-15 ENCOUNTER — TELEPHONE (OUTPATIENT)
Dept: GASTROENTEROLOGY | Facility: CLINIC | Age: 30
End: 2020-05-15

## 2020-05-15 LAB
ALBUMIN SERPL-MCNC: 4.8 G/DL (ref 3.5–5.2)
ALBUMIN/GLOB SERPL: 1.7 G/DL
ALP SERPL-CCNC: 51 U/L (ref 39–117)
ALT SERPL-CCNC: 12 U/L (ref 1–33)
AST SERPL-CCNC: 18 U/L (ref 1–32)
BACTERIA #/AREA URNS HPF: NORMAL /HPF
BILIRUB SERPL-MCNC: 0.5 MG/DL (ref 0.2–1.2)
BUN SERPL-MCNC: 7 MG/DL (ref 6–20)
BUN/CREAT SERPL: 9.5 (ref 7–25)
CALCIUM SERPL-MCNC: 9.8 MG/DL (ref 8.6–10.5)
CHLORIDE SERPL-SCNC: 100 MMOL/L (ref 98–107)
CO2 SERPL-SCNC: 24.3 MMOL/L (ref 22–29)
CREAT SERPL-MCNC: 0.74 MG/DL (ref 0.57–1)
EPI CELLS #/AREA URNS HPF: NORMAL /HPF (ref 0–10)
GLOBULIN SER CALC-MCNC: 2.9 GM/DL
GLUCOSE SERPL-MCNC: 128 MG/DL (ref 65–99)
LIPASE SERPL-CCNC: 18 U/L (ref 13–60)
MUCOUS THREADS URNS QL MICRO: PRESENT /HPF
POTASSIUM SERPL-SCNC: 4.3 MMOL/L (ref 3.5–5.2)
PROT SERPL-MCNC: 7.7 G/DL (ref 6–8.5)
RBC #/AREA URNS HPF: NORMAL /HPF (ref 0–2)
SODIUM SERPL-SCNC: 139 MMOL/L (ref 136–145)
UREA BREATH TEST QL: NEGATIVE
WBC #/AREA URNS HPF: NORMAL /HPF (ref 0–5)

## 2020-05-18 ENCOUNTER — TELEPHONE (OUTPATIENT)
Dept: GASTROENTEROLOGY | Facility: CLINIC | Age: 30
End: 2020-05-18

## 2020-05-18 NOTE — TELEPHONE ENCOUNTER
h pylori breath test is negative - she needs office f/u for evaluation to determine how we can help her - pls schedule with hazel this week if possible

## 2020-05-21 NOTE — TELEPHONE ENCOUNTER
Call to pt.  Advise per Dr Muse note.  Verb understanding.     Appt scheduled with AGUILAR Zuleta for 5/22 @ 1pm.

## 2020-07-01 ENCOUNTER — OFFICE VISIT (OUTPATIENT)
Dept: GASTROENTEROLOGY | Facility: CLINIC | Age: 30
End: 2020-07-01

## 2020-07-01 VITALS — WEIGHT: 150 LBS | BODY MASS INDEX: 25.61 KG/M2 | TEMPERATURE: 98.6 F | HEIGHT: 64 IN

## 2020-07-01 DIAGNOSIS — K52.9 PROCTOCOLITIS: ICD-10-CM

## 2020-07-01 DIAGNOSIS — K58.0 IRRITABLE BOWEL SYNDROME WITH DIARRHEA: Primary | ICD-10-CM

## 2020-07-01 DIAGNOSIS — R11.2 NAUSEA AND VOMITING, INTRACTABILITY OF VOMITING NOT SPECIFIED, UNSPECIFIED VOMITING TYPE: ICD-10-CM

## 2020-07-01 DIAGNOSIS — R10.9 CHRONIC ABDOMINAL PAIN: ICD-10-CM

## 2020-07-01 DIAGNOSIS — G89.29 CHRONIC ABDOMINAL PAIN: ICD-10-CM

## 2020-07-01 PROCEDURE — 99214 OFFICE O/P EST MOD 30 MIN: CPT | Performed by: NURSE PRACTITIONER

## 2020-07-01 NOTE — PROGRESS NOTES
Chief Complaint   Patient presents with   • Follow-up   • Vomiting   • Gas   • Abdominal Pain       Dionna Panchal is a  29 y.o. female here for a follow up visit for diarrhea.    HPI  29-year-old female presents today for follow-up visit for diarrhea and nausea and vomiting.  She is a patient of Dr. Muse.  She was last seen in the office on 2/5/2020.  She has a history of proctocolitis as well as H. pylori.  She has recently finished all the antibiotics for the H. pylori infection and repeated the breath test for cure and it was negative.  However she continues to complain for the past year of worsening IBS symptoms including nausea and vomiting, abdominal pain, gas and bloating and diarrhea.  She was having alternating days with constipation and diarrhea early on but now tells me for the last several months is just been straight diarrhea.  She denies any new medications or recent antibiotics other than the one she had for H. pylori.  She was in the ER at [facility] on 3/15/2020 it was at that time she was treated for abdominal pain.  She was treated in the hospital last December for proctocolitis.  Last EGD and colonoscopy was done on 4/2019.  She admits all of her symptoms were way worse when she was still smoking marijuana to stimulate her appetite.  She has since quit all marijuana and that her symptoms are somewhat better.  She continues though to only get relief with really hot boiling showers.  She denies any dysphagia, reflux, constipation, rectal bleeding or melena.  She was appetite is decreased and her weight is about 15 pounds less since March.  Past Medical History:   Diagnosis Date   • H. pylori infection        Past Surgical History:   Procedure Laterality Date   • COLONOSCOPY  04/30/2019    Dr Barrett   • UPPER GASTROINTESTINAL ENDOSCOPY  04/13/2019       Scheduled Meds:    Continuous Infusions:  No current facility-administered medications for this visit.     PRN Meds:.    No Known  Allergies    Social History     Socioeconomic History   • Marital status: Single     Spouse name: Not on file   • Number of children: Not on file   • Years of education: Not on file   • Highest education level: Not on file   Tobacco Use   • Smoking status: Never Smoker   • Smokeless tobacco: Never Used   Substance and Sexual Activity   • Alcohol use: Never     Frequency: Never   • Drug use: Never   • Sexual activity: Defer       History reviewed. No pertinent family history.    Review of Systems   Constitutional: Positive for appetite change. Negative for chills, diaphoresis, fatigue, fever and unexpected weight change.   HENT: Negative for nosebleeds, postnasal drip, sore throat, trouble swallowing and voice change.    Respiratory: Negative for cough, choking, chest tightness, shortness of breath and wheezing.    Cardiovascular: Negative for chest pain, palpitations and leg swelling.   Gastrointestinal: Positive for abdominal distention, abdominal pain, diarrhea, nausea and vomiting. Negative for anal bleeding, blood in stool, constipation and rectal pain.   Endocrine: Negative for polydipsia, polyphagia and polyuria.   Musculoskeletal: Negative for gait problem.   Skin: Negative for rash and wound.   Allergic/Immunologic: Negative for food allergies.   Neurological: Negative for dizziness, speech difficulty and light-headedness.   Psychiatric/Behavioral: Negative for confusion, self-injury, sleep disturbance and suicidal ideas.       Vitals:    07/01/20 1116   Temp: 98.6 °F (37 °C)       Physical Exam   Constitutional: She is oriented to person, place, and time. She appears well-developed and well-nourished. She does not appear ill. No distress.   HENT:   Head: Normocephalic.   Eyes: Pupils are equal, round, and reactive to light.   Cardiovascular: Normal rate, regular rhythm and normal heart sounds.   Pulmonary/Chest: Effort normal and breath sounds normal.   Abdominal: Soft. Bowel sounds are normal. She exhibits  distension. She exhibits no mass. There is no hepatosplenomegaly. There is no tenderness. There is no rebound and no guarding. No hernia.   Musculoskeletal: Normal range of motion.   Neurological: She is alert and oriented to person, place, and time.   Skin: Skin is warm and dry.   Psychiatric: She has a normal mood and affect. Her speech is normal and behavior is normal. Judgment normal.       No radiology results for the last 7 days     Assessment and plan     1. Irritable bowel syndrome with diarrhea  - CBC & Differential  - Comprehensive Metabolic Panel  - Amylase  - Lipase  - Gastrointestinal Panel, PCR - Stool, Per Rectum  - Fecal Lactoferrin - Stool, Per Rectum  - Clostridium Difficile Toxin, PCR - Stool, Per Rectum  - riFAXIMin (Xifaxan) 550 MG tablet; Take 1 tablet by mouth Every 8 (Eight) Hours.  Dispense: 42 tablet; Refill: 2    2. Proctocolitis    3. Chronic abdominal pain  - CBC & Differential  - Comprehensive Metabolic Panel  - Amylase  - Lipase  - Gastrointestinal Panel, PCR - Stool, Per Rectum  - Fecal Lactoferrin - Stool, Per Rectum  - Clostridium Difficile Toxin, PCR - Stool, Per Rectum    4. Nausea and vomiting, intractability of vomiting not specified, unspecified vomiting type  - CBC & Differential  - Comprehensive Metabolic Panel  - Amylase  - Lipase  - Gastrointestinal Panel, PCR - Stool, Per Rectum  - Fecal Lactoferrin - Stool, Per Rectum  - Clostridium Difficile Toxin, PCR - Stool, Per Rectum    Reviewed her most recent lab work and testing results with her today.  H. pylori breath test was negative.  Most recent labs were also negative as well as urinalysis.  Patient continues to suffer from episodes of nausea and vomiting with severe abdominal pain with diarrhea.  Will go ahead and repeat labs and stool studies.  I do wonder if she might have some IBS-D/SIBO?  Will trial Xifaxan x2 weeks.  She does promise that she has quit all marijuana.  Continue a bland diet.  Patient to call the office  next week with an update.  Patient to follow-up with me in 1 month.  Patient to go ahead make follow-up scheduled in the next 3 to 4 months with Dr. Muse.  I did advise the patient if her symptoms were to get worse for her to go to the [facility] ER for immediate evaluation.  Patient is agreeable to the plan.

## 2020-07-02 ENCOUNTER — TELEPHONE (OUTPATIENT)
Dept: GASTROENTEROLOGY | Facility: CLINIC | Age: 30
End: 2020-07-02

## 2020-07-02 LAB
ALBUMIN SERPL-MCNC: 5 G/DL (ref 3.5–5.2)
ALBUMIN/GLOB SERPL: 1.7 G/DL
ALP SERPL-CCNC: 46 U/L (ref 39–117)
ALT SERPL-CCNC: 12 U/L (ref 1–33)
AMYLASE SERPL-CCNC: 63 U/L (ref 28–100)
AST SERPL-CCNC: 16 U/L (ref 1–32)
BASOPHILS # BLD AUTO: 0.03 10*3/MM3 (ref 0–0.2)
BASOPHILS NFR BLD AUTO: 0.7 % (ref 0–1.5)
BILIRUB SERPL-MCNC: 0.4 MG/DL (ref 0.2–1.2)
BUN SERPL-MCNC: 11 MG/DL (ref 6–20)
BUN/CREAT SERPL: 13.1 (ref 7–25)
CALCIUM SERPL-MCNC: 9.5 MG/DL (ref 8.6–10.5)
CHLORIDE SERPL-SCNC: 102 MMOL/L (ref 98–107)
CO2 SERPL-SCNC: 24.9 MMOL/L (ref 22–29)
CREAT SERPL-MCNC: 0.84 MG/DL (ref 0.57–1)
EOSINOPHIL # BLD AUTO: 0.04 10*3/MM3 (ref 0–0.4)
EOSINOPHIL NFR BLD AUTO: 1 % (ref 0.3–6.2)
ERYTHROCYTE [DISTWIDTH] IN BLOOD BY AUTOMATED COUNT: 12.6 % (ref 12.3–15.4)
GLOBULIN SER CALC-MCNC: 2.9 GM/DL
GLUCOSE SERPL-MCNC: 92 MG/DL (ref 65–99)
HCT VFR BLD AUTO: 41.8 % (ref 34–46.6)
HGB BLD-MCNC: 14 G/DL (ref 12–15.9)
IMM GRANULOCYTES # BLD AUTO: 0.01 10*3/MM3 (ref 0–0.05)
IMM GRANULOCYTES NFR BLD AUTO: 0.2 % (ref 0–0.5)
LIPASE SERPL-CCNC: 25 U/L (ref 13–60)
LYMPHOCYTES # BLD AUTO: 1.6 10*3/MM3 (ref 0.7–3.1)
LYMPHOCYTES NFR BLD AUTO: 38.1 % (ref 19.6–45.3)
MCH RBC QN AUTO: 32.9 PG (ref 26.6–33)
MCHC RBC AUTO-ENTMCNC: 33.5 G/DL (ref 31.5–35.7)
MCV RBC AUTO: 98.1 FL (ref 79–97)
MONOCYTES # BLD AUTO: 0.4 10*3/MM3 (ref 0.1–0.9)
MONOCYTES NFR BLD AUTO: 9.5 % (ref 5–12)
NEUTROPHILS # BLD AUTO: 2.12 10*3/MM3 (ref 1.7–7)
NEUTROPHILS NFR BLD AUTO: 50.5 % (ref 42.7–76)
NRBC BLD AUTO-RTO: 0 /100 WBC (ref 0–0.2)
PLATELET # BLD AUTO: 222 10*3/MM3 (ref 140–450)
POTASSIUM SERPL-SCNC: 4.1 MMOL/L (ref 3.5–5.2)
PROT SERPL-MCNC: 7.9 G/DL (ref 6–8.5)
RBC # BLD AUTO: 4.26 10*6/MM3 (ref 3.77–5.28)
SODIUM SERPL-SCNC: 141 MMOL/L (ref 136–145)
WBC # BLD AUTO: 4.2 10*3/MM3 (ref 3.4–10.8)

## 2020-07-02 NOTE — TELEPHONE ENCOUNTER
----- Message from JALIL Valentin sent at 7/2/2020  9:17 AM EDT -----  Please let her know all of her labs were normal.  Awaiting stool studies

## 2020-07-09 LAB — C DIFF TOX GENS STL QL NAA+PROBE: NEGATIVE

## 2020-07-12 LAB
ADV 40+41 DNA STL QL NAA+NON-PROBE: NOT DETECTED
ASTRO TYP 1-8 RNA STL QL NAA+NON-PROBE: NOT DETECTED
C CAYETANENSIS DNA STL QL NAA+NON-PROBE: NOT DETECTED
C COLI+JEJ+UPSA DNA STL QL NAA+NON-PROBE: NOT DETECTED
C DIF TOX TCDA+TCDB STL QL NAA+NON-PROBE: NOT DETECTED
CRYPTOSP DNA STL QL NAA+NON-PROBE: NOT DETECTED
E COLI O157 DNA STL QL NAA+NON-PROBE: NORMAL
E HISTOLYT DNA STL QL NAA+NON-PROBE: NOT DETECTED
EAEC PAA PLAS AGGR+AATA ST NAA+NON-PRB: NOT DETECTED
EC STX1+STX2 GENES STL QL NAA+NON-PROBE: NOT DETECTED
EPEC EAE GENE STL QL NAA+NON-PROBE: NOT DETECTED
ETEC LTA+ST1A+ST1B TOX ST NAA+NON-PROBE: NOT DETECTED
G LAMBLIA DNA STL QL NAA+NON-PROBE: NOT DETECTED
NOROVIRUS GI+II RNA STL QL NAA+NON-PROBE: NOT DETECTED
P SHIGELLOIDES DNA STL QL NAA+NON-PROBE: NOT DETECTED
RVA RNA STL QL NAA+NON-PROBE: NOT DETECTED
S ENT+BONG DNA STL QL NAA+NON-PROBE: NOT DETECTED
SAPO I+II+IV+V RNA STL QL NAA+NON-PROBE: NOT DETECTED
SHIGELLA SP+EIEC IPAH ST NAA+NON-PROBE: NOT DETECTED
V CHOL+PARA+VUL DNA STL QL NAA+NON-PROBE: NOT DETECTED
V CHOLERAE DNA STL QL NAA+NON-PROBE: NOT DETECTED
Y ENTEROCOL DNA STL QL NAA+NON-PROBE: NOT DETECTED

## 2020-07-24 ENCOUNTER — TELEPHONE (OUTPATIENT)
Dept: GASTROENTEROLOGY | Facility: CLINIC | Age: 30
End: 2020-07-24

## 2020-07-30 NOTE — TELEPHONE ENCOUNTER
Spoke with Mary at [facility] provider services [phone] regarding Xifaxan PA. Medication was approved valid 07/24/2020-08/07/2020 with approval #[id number]. Left message on patient's vm advising of approval.

## 2020-09-01 LAB
BASOPHILS # BLD AUTO: NORMAL 10*3/UL
EOSINOPHIL # BLD AUTO: NORMAL 10*3/UL
EOSINOPHIL NFR BLD AUTO: NORMAL %
HCT VFR BLD AUTO: NORMAL %
HGB BLD-MCNC: NORMAL G/DL
LYMPHOCYTES # BLD AUTO: NORMAL 10*3/UL
LYMPHOCYTES NFR BLD AUTO: NORMAL %
MONOCYTES NFR BLD AUTO: NORMAL %
NEUTROPHILS NFR BLD AUTO: NORMAL %
PLATELET # BLD AUTO: NORMAL 10*3/UL
RBC # BLD AUTO: NORMAL 10*6/UL
WBC # BLD AUTO: NORMAL X10E3/UL

## 2020-09-02 LAB
GLUCOSE UR QL: NORMAL
KETONES UR QL STRIP: NORMAL
PH UR STRIP: NORMAL [PH]
PROT UR QL STRIP: NORMAL
SP GR UR: NORMAL

## 2020-09-09 ENCOUNTER — TELEPHONE (OUTPATIENT)
Dept: GASTROENTEROLOGY | Facility: CLINIC | Age: 30
End: 2020-09-09

## 2020-09-09 NOTE — TELEPHONE ENCOUNTER
----- Message from Lorin Vasquez Rep sent at 9/9/2020  9:09 AM EDT -----  Regarding: riFAXIMin (Xifaxan) 550 MG tablet   Contact: [phone]  Pharmacy:  [id number] - [place], [place] - [address] [place] [address] - [phone]  - [phone] FX Phone:  [phone] Fax:  [phone]   Address:  [address] [place], [place] [address]       SPOKE TO JENNY FROM PHARMACY PA DENIED, WANTS TO KNOW IF THEY SHOULD APPEAL IT

## 2020-09-09 NOTE — TELEPHONE ENCOUNTER
See notes of 7/24.     Call to pt.  States did improve with first round of xifaxan.  Needs 2nd round because once again having diarrhea and abd pain.     Call to [facility] and spoke with Stephanie.  Advise of above.  She will contact specialty pharmacy to appeal.

## 2020-09-15 NOTE — TELEPHONE ENCOUNTER
Call from Danielle with [facility] @ [phone].  States needs documentation as to why pt needs second round xifaxan.  States note of 9/9 acceptable.     Per instructions, note faxed to  - attn: Danielle.  Confirmation received.

## 2020-09-17 ENCOUNTER — OFFICE VISIT (OUTPATIENT)
Dept: GASTROENTEROLOGY | Facility: CLINIC | Age: 30
End: 2020-09-17

## 2020-09-17 VITALS — HEIGHT: 64 IN | BODY MASS INDEX: 26.17 KG/M2 | WEIGHT: 153.3 LBS | TEMPERATURE: 97.7 F

## 2020-09-17 DIAGNOSIS — F41.9 ANXIETY: ICD-10-CM

## 2020-09-17 DIAGNOSIS — N94.6 MENSTRUAL PAIN: ICD-10-CM

## 2020-09-17 DIAGNOSIS — K58.0 IRRITABLE BOWEL SYNDROME WITH DIARRHEA: ICD-10-CM

## 2020-09-17 DIAGNOSIS — R10.32 LLQ PAIN: Primary | ICD-10-CM

## 2020-09-17 PROCEDURE — 99214 OFFICE O/P EST MOD 30 MIN: CPT | Performed by: INTERNAL MEDICINE

## 2020-09-17 RX ORDER — DICYCLOMINE HCL 20 MG
20 TABLET ORAL EVERY 6 HOURS
Qty: 90 TABLET | Refills: 3 | Status: SHIPPED | OUTPATIENT
Start: 2020-09-17 | End: 2021-02-22

## 2020-09-17 NOTE — PROGRESS NOTES
Subjective   Chief Complaint   Patient presents with   • Irritable Bowel Syndrome       Dionna Panchal is a  29 y.o. female here for a follow up visit for IBS.  She was last seen in the office in July 2020.    She has a history of previous H. pylori which has been treated.  She also has a history of symptoms which were worsened with marijuana use.  Since her last office visit she is tried 1 round of Xifaxan.  She did report improvement after the first round.  When she completed the Xifaxan her diarrhea worsened.  She reports episodic upper abdominal pain but this is much less frequent than the lower abdominal pain.  She reports hunger pains that are relieved with eating.    She continues to abstain from marijuana.  She was having significant nausea and vomiting when she was actively using this and agrees that this was definitely contributing to her symptoms.  She has not had nausea and vomiting since she stopped using.    She complains of left lower quadrant pain - crampy - worse with menstruation.  She has never seen a gynecologist.  She reports significant fear and trepidation about seeing a gynecologist.  She reports she had a procedure in the past and has significant anxiety as a result.  She is tearful as she is telling me this.  HPI  Past Medical History:   Diagnosis Date   • H. pylori infection    • Irritable bowel syndrome      Past Surgical History:   Procedure Laterality Date   • COLONOSCOPY  04/30/2019    Dr Barrett   • UPPER GASTROINTESTINAL ENDOSCOPY  04/13/2019       Current Outpatient Medications:   •  dicyclomine (Bentyl) 20 MG tablet, Take 1 tablet by mouth Every 6 (Six) Hours., Disp: 90 tablet, Rfl: 3  •  promethazine (PHENERGAN) 25 MG suppository, Insert one suppository into the rectum every 6 (six) hours as needed for nausea or vomiting., Disp: 15 suppository, Rfl: 0  •  riFAXIMin (Xifaxan) 550 MG tablet, Take 1 tablet by mouth Every 8 (Eight) Hours., Disp: 42 tablet, Rfl: 2  PRN Meds:.  No Known  Allergies  Social History     Socioeconomic History   • Marital status: Single     Spouse name: Not on file   • Number of children: Not on file   • Years of education: Not on file   • Highest education level: Not on file   Tobacco Use   • Smoking status: Never Smoker   • Smokeless tobacco: Never Used   Substance and Sexual Activity   • Alcohol use: Never     Frequency: Never   • Drug use: Never   • Sexual activity: Defer     History reviewed. No pertinent family history.  Review of Systems   Constitutional: Negative for appetite change and unexpected weight change.   Gastrointestinal: Positive for abdominal pain and diarrhea. Negative for nausea and vomiting.   Genitourinary: Positive for pelvic pain.   Psychiatric/Behavioral: Positive for sleep disturbance.   All other systems reviewed and are negative.    Vitals:    09/17/20 1050   Temp: 97.7 °F (36.5 °C)         09/17/20  1050   Weight: 69.5 kg (153 lb 4.8 oz)       Objective   Physical Exam  Constitutional:       Appearance: Normal appearance. She is well-developed.   HENT:      Head: Normocephalic and atraumatic.   Eyes:      General: No scleral icterus.     Conjunctiva/sclera: Conjunctivae normal.   Neck:      Musculoskeletal: Normal range of motion and neck supple.   Pulmonary:      Effort: Pulmonary effort is normal.      Breath sounds: Normal breath sounds.   Abdominal:      General: There is no distension.      Palpations: Abdomen is soft.      Tenderness: There is abdominal tenderness.       Skin:     General: Skin is warm and dry.   Neurological:      Mental Status: She is alert.   Psychiatric:         Mood and Affect: Mood normal.         Behavior: Behavior normal.       No radiology results for the last 7 days    Assessment/Plan   Diagnoses and all orders for this visit:    LLQ pain  -     Ambulatory Referral to Obstetrics / Gynecology    Irritable bowel syndrome with diarrhea    Menstrual pain  -     Ambulatory Referral to Obstetrics /  Gynecology    Anxiety  -     Ambulatory Referral to Obstetrics / Gynecology    Other orders  -     dicyclomine (Bentyl) 20 MG tablet; Take 1 tablet by mouth Every 6 (Six) Hours.      Plan:  · Recommend repeating a course of Xifaxan as she did benefit from this.  She is had recurrent symptoms since she completed the first round.  · She would benefit from an antispasmodic-trial IBgard in the interim and will see if we can get passport to improve this.  · I am concerned that she has some gynecologic contribution to her issues as her pain is significantly worsened with her menstrual cycle.  She reports she is never seen a gynecologist due to significant anxiety related to a previous gynecologic Hologic procedure when she was younger.  We discussed this at length.  I stressed the importance of getting a gynecologist involved to see if there is any contributing issue and she seems to be agreeable at this time.  Will refer her to Bernadette Mcneal to discuss further

## 2020-09-22 NOTE — TELEPHONE ENCOUNTER
Call to [facility] and spoke with Kathleen.  States [facility] requires them to follow with denial letter.  Have not received.     Per request, call to [facility] @ [phone] and spoke with Dora. Will fax denial letter and instructions for appeal to .

## 2020-09-22 NOTE — TELEPHONE ENCOUNTER
Denial letter received.  See media tab.     Per instructions, call to  and spoke with [employer].  Request urgent appeal for xifaxan - has had recurrence of diarrhea after first successful round of xifaxan.  Devin rivera will email this info to  - should have answer within 72 hours.  Decision will be faxed.     Call to [facility] and spoke with Lindsay.  Advise that urgent appeal in process.  Verb understanding.

## 2020-09-22 NOTE — TELEPHONE ENCOUNTER
Called pt and pt reports that she has not been able to get the second round of xifaxan.  Advised pt that is appears it is denied and we are waiting on the denial letter so we can see how to appeal.  Pt verb understanding.

## 2020-09-25 NOTE — TELEPHONE ENCOUNTER
Faxed notification received from [employer] that xifaxan appeal has been approved.  Good from 9/22/20 - 10/8/10.  See media tab.     Call to [facility] and spoke Cortney.  Advise of above.  They will contact pt to arrange  at her [place].  Verb understanding.     Update to Dr Muse.

## 2020-10-29 ENCOUNTER — TELEPHONE (OUTPATIENT)
Dept: GASTROENTEROLOGY | Facility: CLINIC | Age: 30
End: 2020-10-29

## 2020-10-29 ENCOUNTER — OFFICE VISIT (OUTPATIENT)
Dept: GASTROENTEROLOGY | Facility: CLINIC | Age: 30
End: 2020-10-29

## 2020-10-29 VITALS — TEMPERATURE: 96.2 F | WEIGHT: 148.6 LBS | BODY MASS INDEX: 25.37 KG/M2 | HEIGHT: 64 IN

## 2020-10-29 DIAGNOSIS — K58.0 IRRITABLE BOWEL SYNDROME WITH DIARRHEA: ICD-10-CM

## 2020-10-29 DIAGNOSIS — R10.32 LEFT LOWER QUADRANT ABDOMINAL PAIN: ICD-10-CM

## 2020-10-29 DIAGNOSIS — K63.89 SMALL INTESTINAL BACTERIAL OVERGROWTH: Primary | ICD-10-CM

## 2020-10-29 PROCEDURE — 99214 OFFICE O/P EST MOD 30 MIN: CPT | Performed by: NURSE PRACTITIONER

## 2020-10-29 RX ORDER — ESCITALOPRAM OXALATE 10 MG/1
20 TABLET ORAL DAILY
COMMUNITY
Start: 2020-10-14 | End: 2021-02-22 | Stop reason: SDUPTHER

## 2020-10-29 NOTE — PROGRESS NOTES
Chief Complaint   Patient presents with   • Abdominal Pain   • Diarrhea       Dionna Panchal is a  29 y.o. female here for a follow up visit for diarrhea.    HPI  29-year-old female presents today for follow-up visit for IBS-D.  She is a patient of Dr. Muse.  She was last seen in the office on 9/17/2020.  She has a history of IBS-D/SIBO and admits she is doing really well on Xifaxan.  She has had 2 rounds of it so far and it is really helped her.  She feels like she is at least 50% better.  She did take the Bentyl as well for abdominal pain cramping and she thinks is helped a little bit.  But with the Xifaxan she is not having any bowel bloating or belching and her stools are actually firmer.  She thinks she definitely could use 1 more round of it.  She still needs to follow-up with her GYN about her menstrual issues.  She is not taking any probiotics or fiber at this time.  She denies any dysphagia, reflux, abdominal pain, nausea vomiting, constipation, to bleeding or melena.  She is appetite is good and her weight has dropped 12 pounds since March of this year.  Her last colonoscopy and EGD were on 4/2019 by Dr. Barrett.  Past Medical History:   Diagnosis Date   • H. pylori infection    • Irritable bowel syndrome        Past Surgical History:   Procedure Laterality Date   • COLONOSCOPY  04/30/2019    Dr Barrett   • UPPER GASTROINTESTINAL ENDOSCOPY  04/13/2019       Scheduled Meds:    Continuous Infusions:No current facility-administered medications for this visit.       PRN Meds:.    No Known Allergies    Social History     Socioeconomic History   • Marital status: Single     Spouse name: Not on file   • Number of children: Not on file   • Years of education: Not on file   • Highest education level: Not on file   Tobacco Use   • Smoking status: Never Smoker   • Smokeless tobacco: Never Used   Substance and Sexual Activity   • Alcohol use: Never     Frequency: Never   • Drug use: Never   • Sexual activity: Defer        History reviewed. No pertinent family history.    Review of Systems   Constitutional: Negative for appetite change, chills, diaphoresis, fatigue, fever and unexpected weight change.   HENT: Negative for nosebleeds, postnasal drip, sore throat, trouble swallowing and voice change.    Respiratory: Negative for cough, choking, chest tightness, shortness of breath and wheezing.    Cardiovascular: Negative for chest pain, palpitations and leg swelling.   Gastrointestinal: Positive for abdominal distention. Negative for abdominal pain, anal bleeding, blood in stool, constipation, diarrhea, nausea, rectal pain and vomiting.   Endocrine: Negative for polydipsia, polyphagia and polyuria.   Musculoskeletal: Negative for gait problem.   Skin: Negative for rash and wound.   Allergic/Immunologic: Negative for food allergies.   Neurological: Negative for dizziness, speech difficulty and light-headedness.   Psychiatric/Behavioral: Negative for confusion, self-injury, sleep disturbance and suicidal ideas.       Vitals:    10/29/20 1113   Temp: 96.2 °F (35.7 °C)       Physical Exam  Constitutional:       General: She is not in acute distress.     Appearance: She is well-developed. She is not ill-appearing.   HENT:      Head: Normocephalic.   Eyes:      Pupils: Pupils are equal, round, and reactive to light.   Cardiovascular:      Rate and Rhythm: Normal rate and regular rhythm.      Heart sounds: Normal heart sounds.   Pulmonary:      Effort: Pulmonary effort is normal.      Breath sounds: Normal breath sounds.   Abdominal:      General: Bowel sounds are normal. There is no distension.      Palpations: Abdomen is soft. There is no mass.      Tenderness: There is no abdominal tenderness. There is no guarding or rebound.      Hernia: No hernia is present.   Musculoskeletal: Normal range of motion.   Skin:     General: Skin is warm and dry.   Neurological:      Mental Status: She is alert and oriented to person, place, and time.    Psychiatric:         Speech: Speech normal.         Behavior: Behavior normal.         Judgment: Judgment normal.         No radiology results for the last 7 days     Assessment and plan     1. Irritable bowel syndrome with diarrhea  - riFAXIMin (Xifaxan) 550 MG tablet; Take 1 tablet by mouth Every 8 (Eight) Hours.  Dispense: 42 tablet; Refill: 0    2. Small intestinal bacterial overgrowth    3. Left lower quadrant abdominal pain    IBS-D/SIBO seems to be really responding well to Xifaxan.  She is on her second round currently.  We will go ahead and have her take a third round once she is done with that 1.  Overall she seems to be doing much better.  Patient to still follow-up with her GYN as planned.  Patient to call the office with any issues.  Patient to follow-up with me in 3 months.

## 2020-10-29 NOTE — TELEPHONE ENCOUNTER
----- Message from Lorin Beltran sent at 10/29/2020  3:19 PM EDT -----  Regarding: riFAXIMin (Xifaxan) 550 MG tablet  Contact: [phone]  Radha called from pharmacy PT needs PA for prescription refill.

## 2020-12-04 ENCOUNTER — PRIOR AUTHORIZATION (OUTPATIENT)
Dept: GASTROENTEROLOGY | Facility: CLINIC | Age: 30
End: 2020-12-04

## 2020-12-07 ENCOUNTER — TELEPHONE (OUTPATIENT)
Dept: GASTROENTEROLOGY | Facility: CLINIC | Age: 30
End: 2020-12-07

## 2020-12-07 RX ORDER — NITAZOXANIDE 500 MG/1
500 TABLET ORAL 2 TIMES DAILY WITH MEALS
Qty: 20 TABLET | Refills: 0 | Status: SHIPPED | OUTPATIENT
Start: 2020-12-07 | End: 2020-12-17

## 2020-12-07 NOTE — TELEPHONE ENCOUNTER
----- Message from Lorin Vasquez Rep sent at 12/4/2020  4:01 PM EST -----  Regarding: med /questions  Contact: [phone]  Pt is aware PA for Xifaxan was denied. Would like a call back to discuss what other options she has. She has seen both Yesica and Claribel , she said Claribel is the one she has been seeing , hence why I sent this to you guys.

## 2020-12-07 NOTE — TELEPHONE ENCOUNTER
I would see if her insurance might cover Alinia better if we do not have any Xifaxan samples.  I did see some samples last week in the med because it but that does not mean they are still there today.  I would try Alinia 500 mg p.o. twice daily x10 days.  I will send the prescription over.  Have Her let us know if it is covered or not.

## 2020-12-09 NOTE — TELEPHONE ENCOUNTER
Call to pt.  Advise per M Merlyn note - advise rx at pharmacy.  Notify this office if too expensive. Verb understanding.

## 2020-12-15 ENCOUNTER — TELEPHONE (OUTPATIENT)
Dept: GASTROENTEROLOGY | Facility: CLINIC | Age: 30
End: 2020-12-15

## 2020-12-15 NOTE — TELEPHONE ENCOUNTER
----- Message from Lorin Beltran sent at 12/15/2020  2:36 PM EST -----  Regarding: nitazoxanide (Alinia) 500 MG tablet  Contact: [phone]  PT needs prescription for nitazoxanide (Alinia) 500 MG tablet  verified at the pharmacy so she can . Please verify medication for PT     Pharmacy:  [id number] - [place], [place] - [address] [place] [address] - [phone]  - [phone] FX  Phone:  [phone]  Fax:  [phone]  Address:  [address] [place] [address]  Pharmacy Comments: --

## 2020-12-15 NOTE — TELEPHONE ENCOUNTER
Called pt's [facility] and was advised that the Alinia needs a pa.    Message sent to MA's regarding pa.

## 2020-12-29 NOTE — TELEPHONE ENCOUNTER
DANIEL Hinds faxed along with clinicals to [facility] [phone] with confiramation received. Awaiting decision

## 2021-01-18 ENCOUNTER — OFFICE VISIT (OUTPATIENT)
Dept: GASTROENTEROLOGY | Facility: CLINIC | Age: 31
End: 2021-01-18

## 2021-01-18 VITALS — WEIGHT: 155 LBS | HEIGHT: 64 IN | BODY MASS INDEX: 26.46 KG/M2

## 2021-01-18 DIAGNOSIS — K63.89 SMALL INTESTINAL BACTERIAL OVERGROWTH: ICD-10-CM

## 2021-01-18 DIAGNOSIS — K58.0 IRRITABLE BOWEL SYNDROME WITH DIARRHEA: Primary | ICD-10-CM

## 2021-01-18 DIAGNOSIS — R15.2 FECAL URGENCY: ICD-10-CM

## 2021-01-18 PROCEDURE — 99443 PR PHYS/QHP TELEPHONE EVALUATION 21-30 MIN: CPT | Performed by: NURSE PRACTITIONER

## 2021-01-18 RX ORDER — BUPROPION HYDROCHLORIDE 150 MG/1
150 TABLET, EXTENDED RELEASE ORAL DAILY
COMMUNITY
End: 2021-02-22

## 2021-01-18 NOTE — PROGRESS NOTES
Chief Complaint   Patient presents with   • SIBO   • Irritable Bowel Syndrome       Dionna Panchal is a  30 y.o. female here for a telephone follow up visit for IBS.    HPI  29 yo f presents today for telephone follow up visit for IBS-D. She is a patient of Dr. Muse. She was last seen in the office by me on 10/29/20. You have chosen to receive care through a telephone visit. Do you consent to use a telephone visit for your medical care today? YES.    History of IBS-D/SIBO and admits she is done really well on 2 rounds of Xifaxan.  Unfortunately she wanted to take the third round but due to insurance has been held up.  Our office is currently working on that third round approval.  Prior authorization was required.  Patient admits though currently her bowels are actually pretty well.  She is having days where she is having formed bowel movements which is huge for her.  She still occasionally having some days where she feels like stress and her food choices do cause some fecal urgency and some loose stools but that has not been an every day occurrence like it was previously.  She denies any dysphagia, nausea and vomiting, constipation, rectal bleeding or melena.  She notes her appetite is good and her weight is stable.  Her last colonoscopy and EGD were on 4/30/2019.  Overall she feels like she is doing much better and she is not having to use the Bentyl like before.      Past Medical History:   Diagnosis Date   • H. pylori infection    • Irritable bowel syndrome        Past Surgical History:   Procedure Laterality Date   • COLONOSCOPY  04/30/2019    Dr Barrett   • UPPER GASTROINTESTINAL ENDOSCOPY  04/13/2019       Scheduled Meds:    Continuous Infusions:No current facility-administered medications for this visit.       PRN Meds:.    No Known Allergies    Social History     Socioeconomic History   • Marital status: Single     Spouse name: Not on file   • Number of children: Not on file   • Years of education: Not on  file   • Highest education level: Not on file   Tobacco Use   • Smoking status: Never Smoker   • Smokeless tobacco: Never Used   Substance and Sexual Activity   • Alcohol use: Never     Frequency: Never   • Drug use: Never   • Sexual activity: Defer       History reviewed. No pertinent family history.    Review of Systems   Constitutional: Negative for appetite change, chills, diaphoresis, fatigue, fever and unexpected weight change.   HENT: Negative for nosebleeds, postnasal drip, sore throat, trouble swallowing and voice change.    Respiratory: Negative for cough, choking, chest tightness, shortness of breath and wheezing.    Cardiovascular: Negative for chest pain, palpitations and leg swelling.   Gastrointestinal: Positive for abdominal distention. Negative for abdominal pain, anal bleeding, blood in stool, constipation, diarrhea, nausea, rectal pain and vomiting.   Endocrine: Negative for polydipsia, polyphagia and polyuria.   Musculoskeletal: Negative for gait problem.   Skin: Negative for rash and wound.   Allergic/Immunologic: Negative for food allergies.   Neurological: Negative for dizziness, speech difficulty and light-headedness.   Psychiatric/Behavioral: Negative for confusion, self-injury, sleep disturbance and suicidal ideas.       There were no vitals filed for this visit.    Physical Exam  Constitutional:       General: She is not in acute distress.  Neurological:      Mental Status: She is oriented to person, place, and time.   Psychiatric:         Mood and Affect: Mood normal.         Thought Content: Thought content normal.         Judgment: Judgment normal.         No radiology results for the last 7 days     Assessment and plan     1. Irritable bowel syndrome with diarrhea    2. Small intestinal bacterial overgrowth    3. Fecal urgency      Today's visit was done over the telephone.  Total time on the phone with the patient was 25 minutes.  Overall I think she is doing much better.  But she is  no means yet 100%.  I do want her to take that third round of Xifaxan when she can get her hands on it.  Continue Bentyl as needed.  Patient to call the office with any issues.  Patient to follow-up with me in 3 months.  Patient is agreeable to the plan.

## 2021-02-08 ENCOUNTER — HOSPITAL ENCOUNTER (EMERGENCY)
Facility: HOSPITAL | Age: 31
Discharge: LEFT BEFORE DISCHARGE | End: 2021-02-08

## 2021-02-08 VITALS
BODY MASS INDEX: 26.61 KG/M2 | HEIGHT: 64 IN | TEMPERATURE: 98.5 F | OXYGEN SATURATION: 97 % | RESPIRATION RATE: 18 BRPM | HEART RATE: 80 BPM

## 2021-02-08 PROCEDURE — 99211 OFF/OP EST MAY X REQ PHY/QHP: CPT

## 2021-02-09 NOTE — ED TRIAGE NOTES
"Pt to ER from home via PV. Pt states she was seen at urgent care earlier today. Pt states several days ago she had an \"episode\" after mixing alcohol with new medication. Pt states she is unsure what happened. Pt reports pain to hands, feet and head since. Pt requesting X rays and CT scan to find out what is going on.    Pt states hx of \"episodes\" in the past where she loses consciousness and mobility.     Pt NAD, ambulatory without difficulty in triage.      Pt masked in triage, staff in appropriate ppe.    "

## 2021-02-09 NOTE — ED NOTES
"Pt presents to triage desk stating she wants to go home.  Pt states \"there's a snow storm coming and all I needed was a CT scan and I'm not staying here to get stuck in the storm\".       Sara Murillo, RN  02/08/21 7472    "

## 2021-02-22 ENCOUNTER — OFFICE VISIT (OUTPATIENT)
Dept: FAMILY MEDICINE CLINIC | Facility: CLINIC | Age: 31
End: 2021-02-22

## 2021-02-22 VITALS
TEMPERATURE: 96 F | HEART RATE: 74 BPM | BODY MASS INDEX: 26.95 KG/M2 | DIASTOLIC BLOOD PRESSURE: 82 MMHG | SYSTOLIC BLOOD PRESSURE: 140 MMHG | WEIGHT: 157 LBS | OXYGEN SATURATION: 99 % | RESPIRATION RATE: 15 BRPM

## 2021-02-22 DIAGNOSIS — IMO0001 HORMONAL IMBALANCE IN TRANSGENDER PATIENT: Primary | ICD-10-CM

## 2021-02-22 DIAGNOSIS — F41.8 DEPRESSION WITH ANXIETY: ICD-10-CM

## 2021-02-22 PROBLEM — K59.00 CONSTIPATION: Status: ACTIVE | Noted: 2018-01-17

## 2021-02-22 PROCEDURE — 99214 OFFICE O/P EST MOD 30 MIN: CPT | Performed by: FAMILY MEDICINE

## 2021-02-22 RX ORDER — TESTOSTERONE CYPIONATE 200 MG/ML
INJECTION, SOLUTION INTRAMUSCULAR
Qty: 10 ML | Refills: 1 | Status: SHIPPED | OUTPATIENT
Start: 2021-02-22 | End: 2023-03-27

## 2021-02-22 RX ORDER — ESCITALOPRAM OXALATE 20 MG/1
20 TABLET ORAL DAILY
Qty: 30 TABLET | Refills: 0 | Status: SHIPPED | OUTPATIENT
Start: 2021-02-22 | End: 2021-10-11 | Stop reason: SDUPTHER

## 2021-02-22 NOTE — PATIENT INSTRUCTIONS
Transmale Resources    [facility] - https://[employer]/    [facility] Transgender Support Group - Facebook group    World Professional Association for Transgender Health, Standards of Care  https://www.wpath.org/publications/soc - pages 37 and 40 especially     [facility], Transgender Health - http://transhealth.[facility]/guidelines      [facility] - [email or web address]-[email or web address]/transhealth/    [facility] - [email or web address].org/care/medical/transgender-health/     Parents, Families, and Friends of Lesbians and [place] - https://www.pflag.org/[email or web address]     [facility] [place] - https://transequality.org/documents    * * * * * * *      www.[employer]     1cc, Luer-Corinna Tip  Syringes - ex: https://www.TechZel/dp/Z01KVVQN2T     18 G x 1.5” hypodermic needles for drawing up - ex: https://www.TechZel/dp/W52A4AJ50E     25 G x 5/8” hypodermic needles for injecting - ex: https://www.TechZel/dp/U68MKIRRR1/     [facility] Trans Health Injection Guide -   https://[facility].org/wp-content/uploads/MG-6_TransHealth_InjectionGuide.pdf     Injection supplies:  Syringes   Needles   Alcohol swabs  Cotton balls  Sharps container

## 2021-02-22 NOTE — PROGRESS NOTES
"Chief Complaint  gender affirming hormone therapy    Subjective    History of Present Illness {CC  Problem List  Visit  Diagnosis   Encounters  Notes  Medications  Labs  Result Review Imaging  Media :23}     Dionna Panchal presents to [facility] for gender affirming hormone therapy.  History of Present Illness     Here to establish care and to discuss gender-affirming hormone therapy. Has felt like a boy for as long as he can remember. Recalls seeing himself in the mirror as a child, was looking particularly \"boyish\" and found it very affirming. Didn't feel like he had the awareness of what it meant to be trans for a long time, and has only recently realized that there might be something he can do about it. Has been seeing a therapist since last July, which has been very helpful and has moved him along towards discovering his true self.     Recently ended a long-term relationship. Has no real family support at this time. Isn't out to anyone other than therapist. Still living with mom, but it isn't a good relationship. Isn't generally trustful of the medical community.  Feels that he can no longer live as a male and reassures me that he has plenty of people who can support him if his mom \"freaks out and kicked me out\".  Also sounds as if his stepfather is generally supportive.    Taking some Escitalopram for depression and anxiety.  Reports good adherence and tolerance.  Has previously been getting from a psychiatrist, would happily have me take over prescribing if that is possible.    Objective     Vital Signs:   /82   Pulse 74   Temp 96 °F (35.6 °C)   Resp 15   Wt 71.2 kg (157 lb)   SpO2 99%   BMI 26.95 kg/m²   Physical Exam  Vitals signs and nursing note reviewed.   Constitutional:       General: She is not in acute distress.     Appearance: Normal appearance. She is well-developed. She is not ill-appearing.   HENT:      Right Ear: Tympanic membrane, ear canal " and external ear normal.      Left Ear: Tympanic membrane, ear canal and external ear normal.      Nose: Nose normal.      Mouth/Throat:      Mouth: Mucous membranes are moist.      Pharynx: Oropharynx is clear.   Eyes:      Extraocular Movements: Extraocular movements intact.      Conjunctiva/sclera: Conjunctivae normal.      Pupils: Pupils are equal, round, and reactive to light.   Neck:      Musculoskeletal: Normal range of motion and neck supple.   Cardiovascular:      Rate and Rhythm: Normal rate and regular rhythm.      Pulses: Normal pulses.      Heart sounds: Normal heart sounds. No murmur.   Pulmonary:      Effort: Pulmonary effort is normal. No respiratory distress.      Breath sounds: Normal breath sounds. No wheezing.   Abdominal:      General: Abdomen is flat. Bowel sounds are normal. There is no distension.      Palpations: Abdomen is soft.      Tenderness: There is no abdominal tenderness. There is no guarding or rebound.   Musculoskeletal: Normal range of motion.         General: No tenderness.   Skin:     General: Skin is warm and dry.   Neurological:      General: No focal deficit present.      Mental Status: She is alert and oriented to person, place, and time.      Sensory: No sensory deficit.   Psychiatric:         Mood and Affect: Mood normal.         Behavior: Behavior normal.          Result Review  Data Reviewed:{ Labs  Result Review  Imaging  Med Tab  Media :23}                   Assessment and Plan {CC Problem List  Visit Diagnosis  ROS  Review (Popup)  Health Maintenance  Quality  BestPractice  Medications  SmartSets  SnapShot Encounters  Media :23}   Diagnoses and all orders for this visit:    1. Hormonal imbalance in transgender patient (Primary)  -     Testosterone Cypionate (DEPOTESTOTERONE CYPIONATE) 200 MG/ML injection; Inject 0.3 ml (60 mg) subcutaneously once weekly.  Dispense: 10 mL; Refill: 1    2. Depression with anxiety  -     escitalopram (LEXAPRO) 20 MG  tablet; Take 1 tablet by mouth Daily.  Dispense: 30 tablet; Refill: 0    Established gender dysphoria, hormonal imbalance in a transgender patient with a strong desire for gender affirming hormone therapy.     Gender identity is consistent, persistent, and insistent. Discussed risks, benefits, alternatives, potential side effects of therapy, and typical follow up. Resources provided including [facility] Standards of Care, PFLAG “Our Trans Loved Ones”, and local support groups.     Meds as above. Gave detailed instructions as to proper subcutaneous injection technique and demonstrated the same. Given a bag of sample injection supplies to last until they are able to obtain their own supply. F/u with a phone call/message in 2-3 weeks, and an office visit in 2 months. Will order labs at f/u. Encouraged to sign up for and use ZÃ¼m XR.    >50% of this 30 min visit spent in counseling about gender-affirming hormone therapy.        Follow Up {Instructions Charge Capture  Follow-up Communications :23}     Patient was given instructions and counseling regarding her condition or for health maintenance advice. Please see specific information pulled into the AVS (placed there by myself) if appropriate.    Return in about 2 months (around 4/22/2021), or if symptoms worsen or fail to improve.      PABLO Lisa MD

## 2021-03-05 ENCOUNTER — TELEPHONE (OUTPATIENT)
Dept: GASTROENTEROLOGY | Facility: CLINIC | Age: 31
End: 2021-03-05

## 2021-03-05 DIAGNOSIS — K58.0 IRRITABLE BOWEL SYNDROME WITH DIARRHEA: Primary | ICD-10-CM

## 2021-03-05 NOTE — TELEPHONE ENCOUNTER
Regarding: Non-Urgent Medical Question  ----- Message from JALIL Valentin sent at 3/4/2021 12:04 PM EST -----  I do not have any Xifaxan's samples at this time.  I do not see any updates yet about the Xifaxan prescription with her insurance?     ----- Message from Dionna Panchal to Claribel Zuleta APRN sent at 3/2/2021 12:34 PM -----   Hello, I was wondering if any samples of Xifaxan had by any chance come in?  And if [employer] has updated you on anything about approving or denying the medication Xifaxan?

## 2021-03-05 NOTE — TELEPHONE ENCOUNTER
Patient called inquiring about Xifaxan and the pa request that we had been working on. She states her [facility] pharmacy did not have an Rx for Xifaxan. Advised patient that per 12/15/20 telephone call, her Xifaxan was approved through [employer]. We attempted to contact her but as noted there was no answer. Patient is requesting another round of Xifaxan be sent to [facility]. Patient also advised PA will be renewed and she would be contacted when a decision was made.

## 2021-03-09 ENCOUNTER — OFFICE VISIT (OUTPATIENT)
Dept: FAMILY MEDICINE CLINIC | Facility: CLINIC | Age: 31
End: 2021-03-09

## 2021-03-09 VITALS
WEIGHT: 155 LBS | TEMPERATURE: 97.8 F | OXYGEN SATURATION: 99 % | RESPIRATION RATE: 16 BRPM | DIASTOLIC BLOOD PRESSURE: 64 MMHG | HEART RATE: 71 BPM | BODY MASS INDEX: 26.61 KG/M2 | SYSTOLIC BLOOD PRESSURE: 100 MMHG

## 2021-03-09 DIAGNOSIS — F44.7: Primary | ICD-10-CM

## 2021-03-09 PROCEDURE — 99214 OFFICE O/P EST MOD 30 MIN: CPT | Performed by: FAMILY MEDICINE

## 2021-03-09 NOTE — PROGRESS NOTES
Chief Complaint  Neurologic Problem    Subjective    History of Present Illness {  Problem List  Visit  Diagnosis   Encounters  Notes  Medications  Labs  Result Review Imaging  Media :23}     Dionna Panchal presents to [facility] for Neurologic Problem.  History of Present Illness     Here for f/u on gender-affirming hormone therapy and with concerns for some spells he's had over the past year. Has had about 5-6 episodes of feeling a sudden rush of warmth to his head and face accompanied by dizziness and short loss of consciousness. The LOC sounds very temporary as he's never really fallen but can't seem to remember details of the episodes. Last was in October, described as the worst. No post ictal periods, no loss of bowel or bladder continence. No shaking with the spells. All episodes have occurred during very stressful times.    Concern is that he's starting to work and drive more, worried that this could interfere with one or the other, or both. Admits that he's feeling much better than he has in a long time. Still seeing a therapist, recently started on some escitalopram, feels that it's very helpful. Coping skills are better than they have been in the past.     Happy with changes associated with testosterone so far. Has noticed some skin and body odor changes. Still anxious about coming out to a larger audience but slowly building confidence.     Objective     Vital Signs:   /64   Pulse 71   Temp 97.8 °F (36.6 °C)   Resp 16   Wt 70.3 kg (155 lb)   SpO2 99%   BMI 26.61 kg/m²   Physical Exam  Vitals and nursing note reviewed.   Constitutional:       General: She is not in acute distress.     Appearance: Normal appearance. She is not ill-appearing.   Cardiovascular:      Rate and Rhythm: Normal rate and regular rhythm.      Pulses: Normal pulses.      Heart sounds: Normal heart sounds. No murmur.   Pulmonary:      Effort: Pulmonary effort is normal. No  respiratory distress.      Breath sounds: Normal breath sounds. No rales.   Neurological:      General: No focal deficit present.      Mental Status: She is alert and oriented to person, place, and time. Mental status is at baseline.      Cranial Nerves: No cranial nerve deficit.      Sensory: No sensory deficit.      Coordination: Coordination normal.   Psychiatric:         Mood and Affect: Mood normal.         Behavior: Behavior normal.          Result Review  Data Reviewed:{ Labs  Result Review  Imaging  Med Tab  Media :23}                   Assessment and Plan {CC Problem List  Visit Diagnosis  ROS  Review (Popup)  Health Maintenance  Quality  BestPractice  Medications  SmartSets  SnapShot Encounters  Media :23}   Diagnoses and all orders for this visit:    1. Conversion disorder with mixed symptoms, acute episode, with psychological stressor (Primary)    We discussed possible etiologies of these episodes. I'm inclined to think they are related to stress and aren't neurologic in origin. I suggested that future episodes may not be as likely given the work that he's done on coping skills and managing baseline anxiety. Encouraged to continue with this work and start to be more in tune with his emotions to see if he can feel himself getting worked up, so he can stop it.     He likes this plan and will keep me updated.     Follow up as below. Encouraged to communicate via [employer] in the meantime.    15 minutes was spent of this 25 minute visit in direct face to face counseling and coordination of care regarding:  Relation between stress and bodily functions.      Follow Up {Instructions Charge Capture  Follow-up Communications :23}     Patient was given instructions and counseling regarding her condition or for health maintenance advice. Please see specific information pulled into the AVS (placed there by myself) if appropriate.    Return in about 1 month (around 4/9/2021) for F/u Gender Affirming  Hormone Therapy.      PABLO Lisa MD

## 2021-03-10 NOTE — TELEPHONE ENCOUNTER
PA for Xifaxan approved through [employer]. Letter in media link. Patient has been notified and is looking forward to getting her medication.    SA- patient needs to have Xifaxan sent to her [facility] pharamacy on file.

## 2021-03-11 NOTE — TELEPHONE ENCOUNTER
Message sent to Claribel GONZALEZ regarding sending xifaxan to pt's [facility] pharmacy.

## 2021-04-16 ENCOUNTER — BULK ORDERING (OUTPATIENT)
Dept: CASE MANAGEMENT | Facility: OTHER | Age: 31
End: 2021-04-16

## 2021-04-16 DIAGNOSIS — Z23 IMMUNIZATION DUE: ICD-10-CM

## 2021-05-05 ENCOUNTER — TELEPHONE (OUTPATIENT)
Dept: GASTROENTEROLOGY | Facility: CLINIC | Age: 31
End: 2021-05-05

## 2021-05-05 NOTE — TELEPHONE ENCOUNTER
Called [facility] specialty pharmacy at number below and spoke with Naya who advised that the pt has had one fill of her xifaxan and needs a refill. She advised that she also needs a pa.  Advised will send message to Adela to begin working on pa.  She verb understanding and wants us to call them when pa done.         Message sent to Adela for xifaxan pa.

## 2021-05-05 NOTE — TELEPHONE ENCOUNTER
----- Message from Lorin Beltran Rep sent at 5/5/2021  1:40 PM EDT -----  Regarding: riFAXIMin (Xifaxan) 550 MG tablet  Contact: [phone]  Ginger From [facility] is calling to clarify if Claribel would like for this PT to continue taking Xifaxan, if so PT will need a PA for medications. Please call so Pharmacy can submit PA and also send clinical notes        [id number] - [address] - [phone]  - [phone] FX   Phone:  [phone]  Fax:  [phone]

## 2021-05-07 ENCOUNTER — HOSPITAL ENCOUNTER (EMERGENCY)
Facility: HOSPITAL | Age: 31
Discharge: HOME | End: 2021-05-07
Attending: EMERGENCY MEDICINE | Admitting: EMERGENCY MEDICINE

## 2021-05-07 ENCOUNTER — APPOINTMENT (OUTPATIENT)
Dept: CT IMAGING | Facility: HOSPITAL | Age: 31
End: 2021-05-07

## 2021-05-07 VITALS
DIASTOLIC BLOOD PRESSURE: 65 MMHG | TEMPERATURE: 98.6 F | WEIGHT: 150 LBS | OXYGEN SATURATION: 99 % | HEIGHT: 64 IN | RESPIRATION RATE: 18 BRPM | SYSTOLIC BLOOD PRESSURE: 112 MMHG | BODY MASS INDEX: 25.61 KG/M2 | HEART RATE: 65 BPM

## 2021-05-07 DIAGNOSIS — R11.0 NAUSEA: ICD-10-CM

## 2021-05-07 DIAGNOSIS — R51.9 GENERALIZED HEADACHE: Primary | ICD-10-CM

## 2021-05-07 PROCEDURE — 25010000002 DIPHENHYDRAMINE PER 50 MG: Performed by: PHYSICIAN ASSISTANT

## 2021-05-07 PROCEDURE — 99283 EMERGENCY DEPT VISIT LOW MDM: CPT

## 2021-05-07 PROCEDURE — 25010000002 PROCHLORPERAZINE 10 MG/2ML SOLUTION: Performed by: PHYSICIAN ASSISTANT

## 2021-05-07 PROCEDURE — 25010000002 KETOROLAC TROMETHAMINE PER 15 MG: Performed by: PHYSICIAN ASSISTANT

## 2021-05-07 PROCEDURE — 96374 THER/PROPH/DIAG INJ IV PUSH: CPT

## 2021-05-07 PROCEDURE — 96375 TX/PRO/DX INJ NEW DRUG ADDON: CPT

## 2021-05-07 PROCEDURE — 70450 CT HEAD/BRAIN W/O DYE: CPT

## 2021-05-07 RX ORDER — DIPHENHYDRAMINE HYDROCHLORIDE 50 MG/ML
25 INJECTION INTRAMUSCULAR; INTRAVENOUS ONCE
Status: COMPLETED | OUTPATIENT
Start: 2021-05-07 | End: 2021-05-07

## 2021-05-07 RX ORDER — KETOROLAC TROMETHAMINE 15 MG/ML
15 INJECTION, SOLUTION INTRAMUSCULAR; INTRAVENOUS ONCE
Status: COMPLETED | OUTPATIENT
Start: 2021-05-07 | End: 2021-05-07

## 2021-05-07 RX ORDER — SODIUM CHLORIDE 9 MG/ML
125 INJECTION, SOLUTION INTRAVENOUS CONTINUOUS
Status: DISCONTINUED | OUTPATIENT
Start: 2021-05-07 | End: 2021-05-07 | Stop reason: HOSPADM

## 2021-05-07 RX ORDER — SODIUM CHLORIDE 0.9 % (FLUSH) 0.9 %
10 SYRINGE (ML) INJECTION AS NEEDED
Status: DISCONTINUED | OUTPATIENT
Start: 2021-05-07 | End: 2021-05-07 | Stop reason: HOSPADM

## 2021-05-07 RX ORDER — PROCHLORPERAZINE EDISYLATE 5 MG/ML
10 INJECTION INTRAMUSCULAR; INTRAVENOUS ONCE
Status: COMPLETED | OUTPATIENT
Start: 2021-05-07 | End: 2021-05-07

## 2021-05-07 RX ADMIN — SODIUM CHLORIDE 1000 ML: 9 INJECTION, SOLUTION INTRAVENOUS at 18:42

## 2021-05-07 RX ADMIN — KETOROLAC TROMETHAMINE 15 MG: 15 INJECTION, SOLUTION INTRAMUSCULAR; INTRAVENOUS at 18:45

## 2021-05-07 RX ADMIN — DIPHENHYDRAMINE HYDROCHLORIDE 25 MG: 50 INJECTION, SOLUTION INTRAMUSCULAR; INTRAVENOUS at 18:45

## 2021-05-07 RX ADMIN — PROCHLORPERAZINE EDISYLATE 10 MG: 5 INJECTION INTRAMUSCULAR; INTRAVENOUS at 18:44

## 2021-05-08 NOTE — DISCHARGE INSTRUCTIONS
Please continue to take your previously prescribed Zofran for your nausea.  Alternate between Tylenol and Motrin for your headache.  Follow-up with your primary care doctor if your symptoms continue.

## 2021-06-16 ENCOUNTER — PRIOR AUTHORIZATION (OUTPATIENT)
Dept: GASTROENTEROLOGY | Facility: CLINIC | Age: 31
End: 2021-06-16

## 2021-10-11 DIAGNOSIS — F41.8 DEPRESSION WITH ANXIETY: ICD-10-CM

## 2021-10-11 DIAGNOSIS — K58.0 IRRITABLE BOWEL SYNDROME WITH DIARRHEA: ICD-10-CM

## 2021-10-11 RX ORDER — ESCITALOPRAM OXALATE 20 MG/1
20 TABLET ORAL DAILY
Qty: 30 TABLET | Refills: 0 | Status: SHIPPED | OUTPATIENT
Start: 2021-10-11 | End: 2021-11-20 | Stop reason: SDUPTHER

## 2021-11-20 DIAGNOSIS — F41.8 DEPRESSION WITH ANXIETY: ICD-10-CM

## 2021-11-22 RX ORDER — ESCITALOPRAM OXALATE 20 MG/1
20 TABLET ORAL DAILY
Qty: 90 TABLET | Refills: 1 | Status: SHIPPED | OUTPATIENT
Start: 2021-11-22

## 2022-03-18 RX ORDER — SULFAMETHOXAZOLE AND TRIMETHOPRIM 800; 160 MG/1; MG/1
1 TABLET ORAL 2 TIMES DAILY
Qty: 6 TABLET | Refills: 0 | OUTPATIENT
Start: 2022-03-18 | End: 2023-02-10

## 2022-08-03 ENCOUNTER — TELEPHONE (OUTPATIENT)
Dept: GASTROENTEROLOGY | Facility: CLINIC | Age: 32
End: 2022-08-03

## 2022-08-03 NOTE — TELEPHONE ENCOUNTER
Claribel Zuleta APRN  to [facility] 1 Pool          8/3/22 11:03 AM  If she is having any issues she really needs to be seen in the office.  If it is truly just a medication refill I am happy to do a video visit.  Otherwise she needs to come in.  Thanks     **VM to pt with request to contact office.  Calli Lovett RN.

## 2022-08-03 NOTE — TELEPHONE ENCOUNTER
Caller: Dionna Panchal    Relationship to patient: Self    Best call back number: [phone]     Type of visit: MYCHART VIDEO MEETING      Requested date:NEXT AVAILABLE     Additional notes:PATIENT REQUESTED MYCHART VIDEO MEETING

## 2023-02-10 ENCOUNTER — APPOINTMENT (OUTPATIENT)
Dept: CT IMAGING | Facility: HOSPITAL | Age: 33
End: 2023-02-10
Payer: COMMERCIAL

## 2023-02-10 ENCOUNTER — HOSPITAL ENCOUNTER (EMERGENCY)
Facility: HOSPITAL | Age: 33
Discharge: HOME | End: 2023-02-10
Attending: EMERGENCY MEDICINE | Admitting: EMERGENCY MEDICINE
Payer: COMMERCIAL

## 2023-02-10 VITALS
DIASTOLIC BLOOD PRESSURE: 83 MMHG | HEART RATE: 64 BPM | RESPIRATION RATE: 18 BRPM | HEIGHT: 64 IN | WEIGHT: 200 LBS | SYSTOLIC BLOOD PRESSURE: 131 MMHG | BODY MASS INDEX: 34.15 KG/M2 | TEMPERATURE: 98.2 F | OXYGEN SATURATION: 97 %

## 2023-02-10 DIAGNOSIS — R10.12 LEFT UPPER QUADRANT ABDOMINAL PAIN: Primary | ICD-10-CM

## 2023-02-10 DIAGNOSIS — K58.0 IRRITABLE BOWEL SYNDROME WITH DIARRHEA: ICD-10-CM

## 2023-02-10 DIAGNOSIS — R11.2 NAUSEA AND VOMITING, UNSPECIFIED VOMITING TYPE: ICD-10-CM

## 2023-02-10 DIAGNOSIS — R19.7 DIARRHEA, UNSPECIFIED TYPE: ICD-10-CM

## 2023-02-10 LAB
ALBUMIN SERPL-MCNC: 4.7 G/DL (ref 3.5–5.2)
ALBUMIN/GLOB SERPL: 1.3 G/DL
ALP SERPL-CCNC: 78 U/L (ref 39–117)
ALT SERPL W P-5'-P-CCNC: 17 U/L (ref 1–33)
ANION GAP SERPL CALCULATED.3IONS-SCNC: 9.7 MMOL/L (ref 5–15)
AST SERPL-CCNC: 21 U/L (ref 1–32)
BACTERIA UR QL AUTO: ABNORMAL /HPF
BASOPHILS # BLD AUTO: 0.04 10*3/MM3 (ref 0–0.2)
BASOPHILS NFR BLD AUTO: 0.3 % (ref 0–1.5)
BILIRUB SERPL-MCNC: 0.4 MG/DL (ref 0–1.2)
BILIRUB UR QL STRIP: NEGATIVE
BUN SERPL-MCNC: 9 MG/DL (ref 6–20)
BUN/CREAT SERPL: 10.2 (ref 7–25)
CALCIUM SPEC-SCNC: 9.7 MG/DL (ref 8.6–10.5)
CHLORIDE SERPL-SCNC: 104 MMOL/L (ref 98–107)
CLARITY UR: CLEAR
CO2 SERPL-SCNC: 23.3 MMOL/L (ref 22–29)
COLOR UR: YELLOW
CREAT SERPL-MCNC: 0.88 MG/DL (ref 0.57–1)
DEPRECATED RDW RBC AUTO: 46.3 FL (ref 37–54)
EGFRCR SERPLBLD CKD-EPI 2021: 90.2 ML/MIN/1.73
EOSINOPHIL # BLD AUTO: 0 10*3/MM3 (ref 0–0.4)
EOSINOPHIL NFR BLD AUTO: 0 % (ref 0.3–6.2)
ERYTHROCYTE [DISTWIDTH] IN BLOOD BY AUTOMATED COUNT: 14.2 % (ref 12.3–15.4)
GLOBULIN UR ELPH-MCNC: 3.5 GM/DL
GLUCOSE SERPL-MCNC: 105 MG/DL (ref 65–99)
GLUCOSE UR STRIP-MCNC: NEGATIVE MG/DL
HCG SERPL QL: NEGATIVE
HCT VFR BLD AUTO: 43.1 % (ref 34–46.6)
HGB BLD-MCNC: 14.5 G/DL (ref 12–15.9)
HGB UR QL STRIP.AUTO: ABNORMAL
HYALINE CASTS UR QL AUTO: ABNORMAL /LPF
IMM GRANULOCYTES # BLD AUTO: 0.03 10*3/MM3 (ref 0–0.05)
IMM GRANULOCYTES NFR BLD AUTO: 0.3 % (ref 0–0.5)
KETONES UR QL STRIP: ABNORMAL
LEUKOCYTE ESTERASE UR QL STRIP.AUTO: NEGATIVE
LIPASE SERPL-CCNC: 16 U/L (ref 13–60)
LYMPHOCYTES # BLD AUTO: 1.28 10*3/MM3 (ref 0.7–3.1)
LYMPHOCYTES NFR BLD AUTO: 10.8 % (ref 19.6–45.3)
MAGNESIUM SERPL-MCNC: 2.2 MG/DL (ref 1.6–2.6)
MCH RBC QN AUTO: 30.2 PG (ref 26.6–33)
MCHC RBC AUTO-ENTMCNC: 33.6 G/DL (ref 31.5–35.7)
MCV RBC AUTO: 89.8 FL (ref 79–97)
MONOCYTES # BLD AUTO: 0.55 10*3/MM3 (ref 0.1–0.9)
MONOCYTES NFR BLD AUTO: 4.7 % (ref 5–12)
MUCOUS THREADS URNS QL MICRO: ABNORMAL /HPF
NEUTROPHILS NFR BLD AUTO: 83.9 % (ref 42.7–76)
NEUTROPHILS NFR BLD AUTO: 9.9 10*3/MM3 (ref 1.7–7)
NITRITE UR QL STRIP: NEGATIVE
NRBC BLD AUTO-RTO: 0 /100 WBC (ref 0–0.2)
PH UR STRIP.AUTO: <=5 [PH] (ref 5–8)
PLATELET # BLD AUTO: 346 10*3/MM3 (ref 140–450)
PMV BLD AUTO: 9.7 FL (ref 6–12)
POTASSIUM SERPL-SCNC: 3.9 MMOL/L (ref 3.5–5.2)
PROT SERPL-MCNC: 8.2 G/DL (ref 6–8.5)
PROT UR QL STRIP: ABNORMAL
RBC # BLD AUTO: 4.8 10*6/MM3 (ref 3.77–5.28)
RBC # UR STRIP: ABNORMAL /HPF
REF LAB TEST METHOD: ABNORMAL
SODIUM SERPL-SCNC: 137 MMOL/L (ref 136–145)
SP GR UR STRIP: >=1.03 (ref 1–1.03)
SQUAMOUS #/AREA URNS HPF: ABNORMAL /HPF
UROBILINOGEN UR QL STRIP: ABNORMAL
WBC # UR STRIP: ABNORMAL /HPF
WBC NRBC COR # BLD: 11.8 10*3/MM3 (ref 3.4–10.8)

## 2023-02-10 PROCEDURE — 83735 ASSAY OF MAGNESIUM: CPT | Performed by: PHYSICIAN ASSISTANT

## 2023-02-10 PROCEDURE — 84703 CHORIONIC GONADOTROPIN ASSAY: CPT | Performed by: PHYSICIAN ASSISTANT

## 2023-02-10 PROCEDURE — 74177 CT ABD & PELVIS W/CONTRAST: CPT

## 2023-02-10 PROCEDURE — 25010000002 MORPHINE PER 10 MG: Performed by: EMERGENCY MEDICINE

## 2023-02-10 PROCEDURE — 25010000002 IOPAMIDOL 61 % SOLUTION: Performed by: EMERGENCY MEDICINE

## 2023-02-10 PROCEDURE — 85025 COMPLETE CBC W/AUTO DIFF WBC: CPT | Performed by: PHYSICIAN ASSISTANT

## 2023-02-10 PROCEDURE — 99283 EMERGENCY DEPT VISIT LOW MDM: CPT

## 2023-02-10 PROCEDURE — 81001 URINALYSIS AUTO W/SCOPE: CPT | Performed by: PHYSICIAN ASSISTANT

## 2023-02-10 PROCEDURE — 96374 THER/PROPH/DIAG INJ IV PUSH: CPT

## 2023-02-10 PROCEDURE — 83690 ASSAY OF LIPASE: CPT | Performed by: PHYSICIAN ASSISTANT

## 2023-02-10 PROCEDURE — 96375 TX/PRO/DX INJ NEW DRUG ADDON: CPT

## 2023-02-10 PROCEDURE — 80053 COMPREHEN METABOLIC PANEL: CPT | Performed by: PHYSICIAN ASSISTANT

## 2023-02-10 PROCEDURE — 25010000002 ONDANSETRON PER 1 MG: Performed by: EMERGENCY MEDICINE

## 2023-02-10 RX ORDER — MORPHINE SULFATE 2 MG/ML
4 INJECTION, SOLUTION INTRAMUSCULAR; INTRAVENOUS ONCE
Status: COMPLETED | OUTPATIENT
Start: 2023-02-10 | End: 2023-02-10

## 2023-02-10 RX ORDER — PANTOPRAZOLE SODIUM 40 MG/1
40 TABLET, DELAYED RELEASE ORAL DAILY
Qty: 30 TABLET | Refills: 0 | Status: SHIPPED | OUTPATIENT
Start: 2023-02-10 | End: 2023-03-27

## 2023-02-10 RX ORDER — ONDANSETRON 4 MG/1
4 TABLET, ORALLY DISINTEGRATING ORAL 4 TIMES DAILY PRN
Qty: 15 TABLET | Refills: 0 | Status: SHIPPED | OUTPATIENT
Start: 2023-02-10 | End: 2023-03-27

## 2023-02-10 RX ORDER — ONDANSETRON 2 MG/ML
4 INJECTION INTRAMUSCULAR; INTRAVENOUS ONCE
Status: COMPLETED | OUTPATIENT
Start: 2023-02-10 | End: 2023-02-10

## 2023-02-10 RX ORDER — SODIUM CHLORIDE 0.9 % (FLUSH) 0.9 %
10 SYRINGE (ML) INJECTION AS NEEDED
Status: DISCONTINUED | OUTPATIENT
Start: 2023-02-10 | End: 2023-02-10 | Stop reason: HOSPADM

## 2023-02-10 RX ADMIN — SODIUM CHLORIDE 1000 ML: 9 INJECTION, SOLUTION INTRAVENOUS at 15:39

## 2023-02-10 RX ADMIN — IOPAMIDOL 100 ML: 612 INJECTION, SOLUTION INTRAVENOUS at 17:56

## 2023-02-10 RX ADMIN — ONDANSETRON 4 MG: 2 INJECTION INTRAMUSCULAR; INTRAVENOUS at 15:39

## 2023-02-10 RX ADMIN — MORPHINE SULFATE 4 MG: 2 INJECTION, SOLUTION INTRAMUSCULAR; INTRAVENOUS at 15:39

## 2023-02-10 NOTE — ED PROVIDER NOTES
MD ATTESTATION NOTE  I wore full protective equipment throughout this patient encounter including an N95 face mask, googles, gown and gloves. Hand hygiene was performed before donning protective equipment and after removal when leaving the room.    The COTY and I have discussed this patient's history, physical exam, and treatment plan. I have reviewed the documentation and personally had a face to face interaction with the patient. I affirm the COTY documentation and agree with their diagnostics, findings, treatment, plan, and disposition.    I provided a substantive portion of the care of this patient.  I personally performed the physical exam, in its entirety.  The attached note describes my personal findings.    PCP: Sravan Lisa MD  Patient Care Team:  Sravan Lisa MD as PCP - General (Family Medicine)     Doinna Panchal is a 32 y.o. female who presents to the ED c/o abdominal pain.  Patient complains of left upper quadrant pain for last month.  Patient reports pain is constant, wax and wanes.  Patient describes it as achy and crampy.  Patient reports worsen with eating or drinking.  Patient reports nausea, reports approximately 1 episode of emesis a day, usually in the mornings, emesis nonbloody nonbilious.  Patient reports has been having approximately 1 episode of diarrhea a day, no blood or mucus.  Patient denies any urinary symptoms, no fever or systemic symptoms.  Patient reports history of ulcer not currently on acid medications, has not been taking any over-the-counter acid medications.  When asked why not, patient reports she has not been taking care of herself well.  Patient reports she is followed by Dr. Muse, gastroenterology.    On exam:  General: NAD.  Head: NCAT.  ENT: nares patent, no scleral icterus  Neck: Supple, trachea midline.  Cardiac: regular rate and rhythm.  Lungs: normal effort.  Abdomen: Soft, mild left upper quadrant tenderness to palpation, no right upper  quadrant tenderness, negative Dewitt's, negative McBurney's, no rebound tenderness, no guarding or rigidity.   Extremities: Moves all extremities well, no peripheral edema  Neuro: alert, MAEW, follows commands  Psych: calm, cooperative  Skin: Warm, dry.    Medical Decision Making:  After the initial H&P, I discussed pertinent information from history and physical exam with patient/family.  Discussed differential diagnosis.  Discussed plan for ED evaluation/work-up/treatment.  All questions answered.  Patient/family is agreeable with plan.    ED Course as of 02/11/23 1835   Fri Feb 10, 2023   1534 Patient presents with several day history of severe left upper quadrant, diffuse left-sided abdominal pain, nausea, vomiting, diarrhea.  Differential diagnoses include but not limited to colitis, diverticulitis, gastroenteritis.  Plan for blood work, urine, CT scan for further evaluation to rule out surgical, infectious process. [EE]   1553 WBC(!): 11.80 [EE]   1553 Hemoglobin: 14.5 [EE]   1622 Creatinine: 0.88 [EE]   1622 Magnesium: 2.2 [EE]   1622 HCG Qualitative: Negative [EE]   1622 Lipase: 16 [EE]   1820 CT imaging of the abdomen independently interpreted myself shows no evidence of obstruction.  There is nonspecific thickening of the sigmoid colon as well as the rectum.  Consistent with possible colitis versus proctitis. [EE]   1856 Updated patient on work-up.  She appears nontoxic.  Patient has been on PPIs in the past with good results.  We will restart her on Protonix, as well as Xifaxan.  Also prescribe her Zofran for nausea.  Given her colitis, she is young and healthy.  We will hold off on antibiotics at this time.  I do not believe there is any concern for sepsis or significant infectious process. [EE]      ED Course User Index  [EE] Giacomo Kim PA       Diagnosis  Final diagnoses:   Left upper quadrant abdominal pain   Diarrhea, unspecified type   Nausea and vomiting, unspecified vomiting type         Jack Martinez MD  02/10/23 1900       Jack Martinez MD  02/11/23 6840

## 2023-02-10 NOTE — ED PROVIDER NOTES
EMERGENCY DEPARTMENT ENCOUNTER    Room Number:  01/01  Date of encounter:  2/10/2023  PCP: Sravan Lisa MD  Historian: Patient, friend  Chronic or social conditions impacting care: Nothing      I used full protective equipment while examining this patient.  This includes face mask, gloves and protective eyewear.  I washed my hands before entering the room and immediately upon leaving the room      HPI:  Chief Complaint: Abdominal pain  A complete HPI/ROS/PMH/PSH/SH/FH are unobtainable due to: Nothing    Context: Dionna Panchal is a 32 y.o. female who presents to the ED c/o several day history of diffuse left-sided and left upper quadrant abdominal pain.  Patient states the pain has been present for approximately 1 month however has become intolerable the past several days.  She describes it as an achy, crampy sensation.  Pain is made worse with eating.  She has had associated daily diarrhea, twice a day.  She reports this is watery without any blood.  She also reports intermittent nausea and vomiting over the past 2 days.  She states she has had a history of H. pylori in the past and states her symptoms are somewhat similar.  She has had a prior colonoscopy and EGD.  She denies any fever, chest pain, shortness of breath.  She does not take any medications at home for her abdomen.    Review of Medical Records  I reviewed Epic records from 9/17/2020.  Patient saw gastroenterology, Dr. Muse for abdominal pain and IBS.  Patient did benefit from Xifaxan at that point.    PAST MEDICAL HISTORY  Active Ambulatory Problems     Diagnosis Date Noted   • Macrocytosis 12/24/2019   • Depression with anxiety 10/21/2019   • Hormonal imbalance in transgender patient 02/22/2021     Resolved Ambulatory Problems     Diagnosis Date Noted   • Leukocytosis 12/24/2019     Past Medical History:   Diagnosis Date   • H. pylori infection    • Irritable bowel syndrome          PAST SURGICAL HISTORY  Past Surgical History:    Procedure Laterality Date   • COLONOSCOPY  04/30/2019    Dr Barrett   • UPPER GASTROINTESTINAL ENDOSCOPY  04/13/2019         FAMILY HISTORY  Family History   Problem Relation Age of Onset   • Hypertension Mother    • Anxiety disorder Mother    • Depression Mother    • Alcohol abuse Mother    • Drug abuse Mother    • Brain cancer Father    • Breast cancer Neg Hx    • Colon cancer Neg Hx          SOCIAL HISTORY  Social History     Socioeconomic History   • Marital status: Single   Tobacco Use   • Smoking status: Never   • Smokeless tobacco: Never   Substance and Sexual Activity   • Alcohol use: Yes     Comment: rare social   • Drug use: Not Currently     Types: Marijuana   • Sexual activity: Not Currently     Partners: Female, Male     Comment: single at present         ALLERGIES  Patient has no known allergies.        REVIEW OF SYSTEMS  All systems reviewed and negative except for those discussed in HPI.       PHYSICAL EXAM    I have reviewed the triage vital signs and nursing notes.    ED Triage Vitals [02/10/23 1437]   Temp Heart Rate Resp BP SpO2   98.1 °F (36.7 °C) 76 16 -- 97 %      Temp src Heart Rate Source Patient Position BP Location FiO2 (%)   Tympanic Monitor -- -- --       Physical Exam  GENERAL: Alert, oriented, nontoxic-appearing, not distressed  HENT: head atraumatic, no nuchal rigidity  EYES: no scleral icterus, EOMI  CV: regular rhythm, regular rate, no murmur  RESPIRATORY: normal effort, CTA  ABDOMEN: soft, mild diffuse left-sided abdominal tenderness without guarding or rebound  MUSCULOSKELETAL: no deformity, FROM, no calf swelling or tenderness  NEURO: alert, moves all extremities, follows commands  SKIN: warm, dry        LAB RESULTS  Recent Results (from the past 24 hour(s))   Comprehensive Metabolic Panel    Collection Time: 02/10/23  3:35 PM    Specimen: Blood   Result Value Ref Range    Glucose 105 (H) 65 - 99 mg/dL    BUN 9 6 - 20 mg/dL    Creatinine 0.88 0.57 - 1.00 mg/dL    Sodium 137 136 -  145 mmol/L    Potassium 3.9 3.5 - 5.2 mmol/L    Chloride 104 98 - 107 mmol/L    CO2 23.3 22.0 - 29.0 mmol/L    Calcium 9.7 8.6 - 10.5 mg/dL    Total Protein 8.2 6.0 - 8.5 g/dL    Albumin 4.7 3.5 - 5.2 g/dL    ALT (SGPT) 17 1 - 33 U/L    AST (SGOT) 21 1 - 32 U/L    Alkaline Phosphatase 78 39 - 117 U/L    Total Bilirubin 0.4 0.0 - 1.2 mg/dL    Globulin 3.5 gm/dL    A/G Ratio 1.3 g/dL    BUN/Creatinine Ratio 10.2 7.0 - 25.0    Anion Gap 9.7 5.0 - 15.0 mmol/L    eGFR 90.2 >60.0 mL/min/1.73   Lipase    Collection Time: 02/10/23  3:35 PM    Specimen: Blood   Result Value Ref Range    Lipase 16 13 - 60 U/L   hCG, Serum, Qualitative    Collection Time: 02/10/23  3:35 PM    Specimen: Blood   Result Value Ref Range    HCG Qualitative Negative Negative   Magnesium    Collection Time: 02/10/23  3:35 PM    Specimen: Blood   Result Value Ref Range    Magnesium 2.2 1.6 - 2.6 mg/dL   CBC Auto Differential    Collection Time: 02/10/23  3:35 PM    Specimen: Blood   Result Value Ref Range    WBC 11.80 (H) 3.40 - 10.80 10*3/mm3    RBC 4.80 3.77 - 5.28 10*6/mm3    Hemoglobin 14.5 12.0 - 15.9 g/dL    Hematocrit 43.1 34.0 - 46.6 %    MCV 89.8 79.0 - 97.0 fL    MCH 30.2 26.6 - 33.0 pg    MCHC 33.6 31.5 - 35.7 g/dL    RDW 14.2 12.3 - 15.4 %    RDW-SD 46.3 37.0 - 54.0 fl    MPV 9.7 6.0 - 12.0 fL    Platelets 346 140 - 450 10*3/mm3    Neutrophil % 83.9 (H) 42.7 - 76.0 %    Lymphocyte % 10.8 (L) 19.6 - 45.3 %    Monocyte % 4.7 (L) 5.0 - 12.0 %    Eosinophil % 0.0 (L) 0.3 - 6.2 %    Basophil % 0.3 0.0 - 1.5 %    Immature Grans % 0.3 0.0 - 0.5 %    Neutrophils, Absolute 9.90 (H) 1.70 - 7.00 10*3/mm3    Lymphocytes, Absolute 1.28 0.70 - 3.10 10*3/mm3    Monocytes, Absolute 0.55 0.10 - 0.90 10*3/mm3    Eosinophils, Absolute 0.00 0.00 - 0.40 10*3/mm3    Basophils, Absolute 0.04 0.00 - 0.20 10*3/mm3    Immature Grans, Absolute 0.03 0.00 - 0.05 10*3/mm3    nRBC 0.0 0.0 - 0.2 /100 WBC   Urinalysis With Microscopic If Indicated (No Culture) - Urine,  Clean Catch    Collection Time: 02/10/23  6:21 PM    Specimen: Urine, Clean Catch   Result Value Ref Range    Color, UA Yellow Yellow, Straw    Appearance, UA Clear Clear    pH, UA <=5.0 5.0 - 8.0    Specific Gravity, UA >=1.030 1.005 - 1.030    Glucose, UA Negative Negative    Ketones, UA 40 mg/dL (2+) (A) Negative    Bilirubin, UA Negative Negative    Blood, UA Small (1+) (A) Negative    Protein, UA Trace (A) Negative    Leuk Esterase, UA Negative Negative    Nitrite, UA Negative Negative    Urobilinogen, UA 0.2 E.U./dL 0.2 - 1.0 E.U./dL   Urinalysis, Microscopic Only - Urine, Clean Catch    Collection Time: 02/10/23  6:21 PM    Specimen: Urine, Clean Catch   Result Value Ref Range    RBC, UA 0-2 None Seen, 0-2 /HPF    WBC, UA None Seen None Seen, 0-2 /HPF    Bacteria, UA Trace (A) None Seen /HPF    Squamous Epithelial Cells, UA 0-2 None Seen, 0-2 /HPF    Hyaline Casts, UA None Seen None Seen /LPF    Mucus, UA Small/1+ (A) None Seen, Trace /HPF    Methodology Manual Light Microscopy        Ordered the above labs and independently reviewed the results.        RADIOLOGY  CT Abdomen Pelvis With Contrast    Result Date: 2/10/2023  CT ABDOMEN PELVIS W CONTRAST-  INDICATIONS:  TECHNIQUE: Radiation dose reduction techniques were utilized, including automated exposure control and exposure modulation based on body size. ABDOMEN AND PELVIS CT  COMPARISON:  FINDINGS:  Likely focal fat is seen anteriorly in the left hepatic lobe.  Bilateral renal low densities are noted that are too small to characterize.  Otherwise unremarkable appearance of the liver, gallbladder, spleen, adrenal glands, pancreas, kidneys, bladder. The uterus is retroverted, retroflexed.  No bowel obstruction assessment distal colon for wall thickening is limited by lack of distention. An appearance of mild wall thickening in the sigmoid colon rectum could be from lack of distention or could be evidence of mild nonspecific colitis/proctitis. The appendix  does not appear inflamed.  No free intraperitoneal gas. Minimal pelvic free fluid. Minimal umbilical hernia of fat is present.  Scattered small mesenteric and para-aortic lymph nodes are seen that are not significant by size criteria.  Abdominal aorta is not aneurysmal.  The lung bases show minimal atelectasis.  Degenerative changes are seen in the spine. No acute fracture is identified.         An appearance of mild wall thickening in the sigmoid colon rectum could be from lack of distention or could be evidence of mild nonspecific colitis/proctitis.  No obstructive uropathy.  This report was finalized on 2/10/2023 6:05 PM by Dr. Nolberto Davis M.D.        I ordered the above noted radiological studies. Reviewed by me and discussed with radiologist.  See dictation for official radiology interpretation.      MEDICATIONS GIVEN IN ER    Medications   sodium chloride 0.9 % bolus 1,000 mL (0 mL Intravenous Stopped 2/10/23 1906)   ondansetron (ZOFRAN) injection 4 mg (4 mg Intravenous Given 2/10/23 1539)   morphine injection 4 mg (4 mg Intravenous Given 2/10/23 1539)   iopamidol (ISOVUE-300) 61 % injection 100 mL (100 mL Intravenous Given 2/10/23 1756)         ADDITIONAL ORDERS CONSIDERED BUT NOT ORDERED:  Stated antibiotics for patient's colitis however she is young and healthy.  She is afebrile.  I do not believe this warrants antibiotics.      PROGRESS, DATA ANALYSIS, CONSULTS, AND MEDICAL DECISION MAKING    All labs have been independently interpreted by myself.  All radiology studies have been independently interpreted by myself and discussed with radiologist dictating the report.   EKG's independently interpreted by myself.  Discussion below represents my analysis of pertinent findings related to patient's condition, differential diagnosis, treatment plan and final disposition.    I have discussed case with Dr. Martinez, emergency room physician.  He has performed his own bedside examination and agrees with  treatment plan.    ED Course as of 02/10/23 2352   Fri Feb 10, 2023   1534 Patient presents with several day history of severe left upper quadrant, diffuse left-sided abdominal pain, nausea, vomiting, diarrhea.  Differential diagnoses include but not limited to colitis, diverticulitis, gastroenteritis.  Plan for blood work, urine, CT scan for further evaluation to rule out surgical, infectious process. [EE]   1553 WBC(!): 11.80 [EE]   1553 Hemoglobin: 14.5 [EE]   1622 Creatinine: 0.88 [EE]   1622 Magnesium: 2.2 [EE]   1622 HCG Qualitative: Negative [EE]   1622 Lipase: 16 [EE]   1820 CT imaging of the abdomen independently interpreted myself shows no evidence of obstruction.  There is nonspecific thickening of the sigmoid colon as well as the rectum.  Consistent with possible colitis versus proctitis. [EE]   1856 Updated patient on work-up.  She appears nontoxic.  Patient has been on PPIs in the past with good results.  We will restart her on Protonix, as well as Xifaxan.  Also prescribe her Zofran for nausea.  Given her colitis, she is young and healthy.  We will hold off on antibiotics at this time.  I do not believe there is any concern for sepsis or significant infectious process. [EE]      ED Course User Index  [EE] Giacomo Kim PA       AS OF 23:50 EST VITALS:    BP - 131/83  HR - 64  TEMP - 98.2 °F (36.8 °C) (Oral)  O2 SATS - 97%        DIAGNOSIS  Final diagnoses:   Left upper quadrant abdominal pain   Diarrhea, unspecified type   Nausea and vomiting, unspecified vomiting type         DISPOSITION  Discharged      Dictated utilizing Dragon dictation     Giacomo Kim PA  02/10/23 2352

## 2023-02-13 ENCOUNTER — PATIENT OUTREACH (OUTPATIENT)
Dept: CASE MANAGEMENT | Facility: OTHER | Age: 33
End: 2023-02-13
Payer: COMMERCIAL

## 2023-02-13 NOTE — OUTREACH NOTE
AMBULATORY CASE MANAGEMENT NOTE    Name and Relationship of Patient/Support Person: Dionna Panchal - Self    SDOH updated and reviewed with the patient during this program:  Financial Resource Strain: Low Risk    • Difficulty of Paying Living Expenses: Not hard at all      Food Insecurity: No Food Insecurity   • Worried About Running Out of Food in the Last Year: Never true   • Ran Out of Food in the Last Year: Never true      Transportation Needs: No Transportation Needs   • Lack of Transportation (Medical): No   • Lack of Transportation (Non-Medical): No      Housing Stability: Low Risk    • Unable to Pay for Housing in the Last Year: No   • Number of Places Lived in the Last Year: 1   • Unstable Housing in the Last Year: No     Patient Outreach    Outgoing call to the patient. Introduced self and explained role of RN-ACM and reason for outreach as follow up for ED visit.  She prefers to make her own PCP follow up appointment.  Discussed HRCM program and services.  She prefers enrollment.  Outreach scheduled for 2/14 to complete assessments as she prefers.     Leslye NUÑEZ  Ambulatory Case Management    2/13/2023, 15:10 EST

## 2023-03-13 ENCOUNTER — PATIENT OUTREACH (OUTPATIENT)
Dept: CASE MANAGEMENT | Facility: OTHER | Age: 33
End: 2023-03-13
Payer: COMMERCIAL

## 2023-03-13 NOTE — OUTREACH NOTE
"AMBULATORY CASE MANAGEMENT NOTE    Name and Relationship of Patient/Support Person: Dionna Panchal \"Tone\" - Self  Patient Outreach    Program declined with outreaches x 5 with voicemail for patient and have been unable to reach.    Leslye NUÑEZ  Ambulatory Case Management    3/13/2023, 11:03 EDT  "

## 2023-03-27 ENCOUNTER — OFFICE VISIT (OUTPATIENT)
Dept: FAMILY MEDICINE CLINIC | Facility: CLINIC | Age: 33
End: 2023-03-27
Payer: COMMERCIAL

## 2023-03-27 VITALS
SYSTOLIC BLOOD PRESSURE: 128 MMHG | HEART RATE: 70 BPM | DIASTOLIC BLOOD PRESSURE: 86 MMHG | RESPIRATION RATE: 19 BRPM | OXYGEN SATURATION: 97 % | BODY MASS INDEX: 35.53 KG/M2 | WEIGHT: 207 LBS

## 2023-03-27 DIAGNOSIS — Z30.011 ENCOUNTER FOR INITIAL PRESCRIPTION OF CONTRACEPTIVE PILLS: ICD-10-CM

## 2023-03-27 DIAGNOSIS — K58.2 IRRITABLE BOWEL SYNDROME WITH BOTH CONSTIPATION AND DIARRHEA: Primary | ICD-10-CM

## 2023-03-27 PROBLEM — K59.09 CHRONIC CONSTIPATION: Status: ACTIVE | Noted: 2023-03-27

## 2023-03-27 RX ORDER — AMITRIPTYLINE HYDROCHLORIDE 25 MG/1
25 TABLET, FILM COATED ORAL NIGHTLY
Qty: 90 TABLET | Refills: 0 | Status: SHIPPED | OUTPATIENT
Start: 2023-03-27

## 2023-03-27 RX ORDER — DICYCLOMINE HYDROCHLORIDE 10 MG/1
10 CAPSULE ORAL
Qty: 120 CAPSULE | Refills: 0 | Status: SHIPPED | OUTPATIENT
Start: 2023-03-27

## 2023-03-27 RX ORDER — NORETHINDRONE ACETATE AND ETHINYL ESTRADIOL 1; .02 MG/1; MG/1
1 TABLET ORAL DAILY
Qty: 84 TABLET | Refills: 1 | Status: SHIPPED | OUTPATIENT
Start: 2023-03-27 | End: 2024-03-26

## 2023-03-27 NOTE — PATIENT INSTRUCTIONS
Mix polyethylene glycol (MiraLAX) according to instructions in a large pitcher and keep it in your refrigerator. Drink 1-3 glasses a day to maintain 1-2 soft bowel movements daily. You can increase or decrease your intake as needed. Make sure to drink lots of water as well.    Once you're going regularly, you can stop daily MiraLAX (can take it as needed going forward) and start a large tablespoon of Metamucil in 16 ounces of water every morning. Goal remains 1-2 soft bowel movements daily.    Also drink some kombucha and/or kefir daily.

## 2023-03-27 NOTE — PROGRESS NOTES
Chief Complaint  GI Problem and Irritable Bowel Syndrome    Subjective    History of Present Illness {CC  Problem List  Visit  Diagnosis   Encounters  Notes  Medications  Labs  Result Review Imaging  Media :23}     Dionna Panchal presents to [facility] for GI Problem and Irritable Bowel Syndrome.  History of Present Illness     Here today with concerns as above after an extended absence. Is worried that she has made a mess of her life. Hoping that she can get things back on track.    Anxiety has been quite high of late. Had stopped escitalopram for an extended period and has just recently restarted it. Is slowly titrating up her dose by the week. Currently taking 15 mg. Does feel like it has begun helping. No refill needed at this time.    Continues to experience significant abdominal pain, cramping, and frequent fecal urgency. This has created difficulty in her work life and home life. Was recently given an ultimatum by her longtime girlfriend that she start taking care of herself or the girlfriend will be leaving. Has made some slight dietary changes with some improvements though has never tried an antispasmodic. Has a longstanding history of chronic constipation, has never been on an extended bowel regimen.    Also has rather severe menstrual cramping with heavy menses. Was previously on testosterone but has not been on it in a while now. Was unable to take it previously as she was living with her mother. Would like to table the testosterone issue for now, but is hoping for something that can help with the dysmenorrhea.    Objective     Vital Signs:   /86   Pulse 70   Resp 19   Wt 93.9 kg (207 lb)   SpO2 97%   BMI 35.53 kg/m²   Physical Exam  Vitals and nursing note reviewed.   Constitutional:       General: She is not in acute distress.     Appearance: Normal appearance. She is not ill-appearing.   Cardiovascular:      Rate and Rhythm: Normal rate and regular  rhythm.      Pulses: Normal pulses.      Heart sounds: Normal heart sounds. No murmur heard.  Pulmonary:      Effort: Pulmonary effort is normal. No respiratory distress.      Breath sounds: Normal breath sounds. No rales.   Abdominal:      General: Bowel sounds are increased.      Palpations: Abdomen is soft.      Tenderness: There is generalized abdominal tenderness. There is no guarding or rebound. Negative signs include Dewitt's sign.      Comments: Tympanic   Neurological:      Mental Status: She is alert and oriented to person, place, and time. Mental status is at baseline.   Psychiatric:         Mood and Affect: Mood normal.         Behavior: Behavior normal.          Result Review  Data Reviewed:{ Labs  Result Review  Imaging  Med Tab  Media :23}                   Assessment and Plan {CC Problem List  Visit Diagnosis  ROS  Review (Popup)  Health Maintenance  Quality  BestPractice  Medications  SmartSets  SnapShot Encounters  Media :23}   Diagnoses and all orders for this visit:    1. Irritable bowel syndrome with both constipation and diarrhea (Primary)  -     amitriptyline (ELAVIL) 25 MG tablet; Take 1 tablet by mouth Every Night.  Dispense: 90 tablet; Refill: 0  -     dicyclomine (BENTYL) 10 MG capsule; Take 1 capsule by mouth 4 (Four) Times a Day Before Meals & at Bedtime.  Dispense: 120 capsule; Refill: 0    2. Encounter for initial prescription of contraceptive pills  -     norethindrone-ethinyl estradiol (Loestrin 1/20, 21,) 1-20 MG-MCG per tablet; Take 1 tablet by mouth Daily.  Dispense: 84 tablet; Refill: 1    Long discussion about management today. We will try some amitriptyline and dicyclomine for what sounds like irritable bowel syndrome. Also recommended a daily bowel regimen to help with cleanout and bowel regulation. Information provided in her after visit summary today.    We will try Loestrin as above for menstrual management. We will discuss going back on testosterone in the  near future.    Recommended follow-up as below. Encouraged communication via [employer] in the meantime.    Patient was given instructions and counseling regarding her condition or for health maintenance advice. Please see specific information pulled into the AVS (placed there by myself) if appropriate.    Return in about 1 month (around 4/27/2023), or if symptoms worsen or fail to improve.      PABLO Lisa MD

## 2023-04-27 ENCOUNTER — OFFICE VISIT (OUTPATIENT)
Dept: FAMILY MEDICINE CLINIC | Facility: CLINIC | Age: 33
End: 2023-04-27
Payer: COMMERCIAL

## 2023-04-27 VITALS
BODY MASS INDEX: 36.05 KG/M2 | WEIGHT: 210 LBS | SYSTOLIC BLOOD PRESSURE: 120 MMHG | HEART RATE: 88 BPM | RESPIRATION RATE: 18 BRPM | DIASTOLIC BLOOD PRESSURE: 72 MMHG | OXYGEN SATURATION: 97 %

## 2023-04-27 DIAGNOSIS — F41.8 DEPRESSION WITH ANXIETY: ICD-10-CM

## 2023-04-27 DIAGNOSIS — K58.2 IRRITABLE BOWEL SYNDROME WITH BOTH CONSTIPATION AND DIARRHEA: Primary | ICD-10-CM

## 2023-04-27 PROCEDURE — 99213 OFFICE O/P EST LOW 20 MIN: CPT | Performed by: FAMILY MEDICINE

## 2023-04-27 PROCEDURE — 1159F MED LIST DOCD IN RCRD: CPT | Performed by: FAMILY MEDICINE

## 2023-04-27 PROCEDURE — 1160F RVW MEDS BY RX/DR IN RCRD: CPT | Performed by: FAMILY MEDICINE

## 2023-04-27 RX ORDER — ESCITALOPRAM OXALATE 20 MG/1
20 TABLET ORAL DAILY
Qty: 90 TABLET | Refills: 1 | Status: SHIPPED | OUTPATIENT
Start: 2023-04-27

## 2023-04-27 NOTE — PROGRESS NOTES
Chief Complaint  Irritable Bowel Syndrome    Subjective    History of Present Illness {CC  Problem List  Visit  Diagnosis   Encounters  Notes  Medications  Labs  Result Review Imaging  Media :23}     Dionna Panchal presents to [facility] for Irritable Bowel Syndrome.  History of Present Illness     Today for follow-up as above. Happy to report that she is feeling significantly better with amitriptyline and dicyclomine. Anxiety has been much improved, she believes due to the amitriptyline. Feels that the initial dose might not be sufficient any longer, wondering about titration.    Continues on escitalopram. Reports good adherence and tolerance. Needs a refill today.    Objective     Vital Signs:   /72   Pulse 88   Resp 18   Wt 95.3 kg (210 lb)   SpO2 97%   BMI 36.05 kg/m²   Physical Exam  Vitals and nursing note reviewed.   Constitutional:       General: She is not in acute distress.     Appearance: Normal appearance. She is not ill-appearing.   Cardiovascular:      Rate and Rhythm: Normal rate and regular rhythm.      Pulses: Normal pulses.      Heart sounds: Normal heart sounds. No murmur heard.  Pulmonary:      Effort: Pulmonary effort is normal. No respiratory distress.      Breath sounds: Normal breath sounds. No rales.   Neurological:      Mental Status: She is alert and oriented to person, place, and time. Mental status is at baseline.   Psychiatric:         Mood and Affect: Mood normal.         Behavior: Behavior normal.          Result Review  Data Reviewed:{ Labs  Result Review  Imaging  Med Tab  Media :23}                   Assessment and Plan {CC Problem List  Visit Diagnosis  ROS  Review (Popup)  Health Maintenance  Quality  BestPractice  Medications  SmartSets  SnapShot Encounters  Media :23}   Diagnoses and all orders for this visit:    1. Irritable bowel syndrome with both constipation and diarrhea (Primary)    2. Depression with  anxiety  -     escitalopram (LEXAPRO) 20 MG tablet; Take 1 tablet by mouth Daily.  Dispense: 90 tablet; Refill: 1    Discussed self titration of both dicyclomine and amitriptyline as needed. She will keep me updated and let me know when she needs refills. Refilled escitalopram as requested.    Recommended follow-up as below. Encouraged communication via MyChart in the meantime.          Patient was given instructions and counseling regarding her condition or for health maintenance advice. Please see specific information pulled into the AVS (placed there by myself) if appropriate.    Return in about 3 months (around 7/27/2023), or if symptoms worsen or fail to improve, for Preventive Health Maintenance.      PABLO Lisa MD

## 2023-05-07 DIAGNOSIS — K58.2 IRRITABLE BOWEL SYNDROME WITH BOTH CONSTIPATION AND DIARRHEA: ICD-10-CM

## 2023-05-08 RX ORDER — DICYCLOMINE HYDROCHLORIDE 10 MG/1
10 CAPSULE ORAL
Qty: 120 CAPSULE | Refills: 5 | Status: SHIPPED | OUTPATIENT
Start: 2023-05-08

## 2023-06-13 DIAGNOSIS — K58.2 IRRITABLE BOWEL SYNDROME WITH BOTH CONSTIPATION AND DIARRHEA: ICD-10-CM

## 2023-06-14 RX ORDER — AMITRIPTYLINE HYDROCHLORIDE 25 MG/1
25 TABLET, FILM COATED ORAL NIGHTLY
Qty: 90 TABLET | Refills: 0 | Status: SHIPPED | OUTPATIENT
Start: 2023-06-14

## 2024-02-28 ENCOUNTER — OFFICE VISIT (OUTPATIENT)
Dept: FAMILY MEDICINE CLINIC | Facility: CLINIC | Age: 34
End: 2024-02-28
Payer: COMMERCIAL

## 2024-02-28 VITALS
DIASTOLIC BLOOD PRESSURE: 70 MMHG | RESPIRATION RATE: 18 BRPM | BODY MASS INDEX: 34.66 KG/M2 | OXYGEN SATURATION: 99 % | WEIGHT: 203 LBS | HEART RATE: 67 BPM | SYSTOLIC BLOOD PRESSURE: 108 MMHG | HEIGHT: 64 IN

## 2024-02-28 DIAGNOSIS — F41.9 RECURRENT MODERATE MAJOR DEPRESSIVE DISORDER WITH ANXIETY: Primary | ICD-10-CM

## 2024-02-28 DIAGNOSIS — F33.1 RECURRENT MODERATE MAJOR DEPRESSIVE DISORDER WITH ANXIETY: Primary | ICD-10-CM

## 2024-02-28 PROCEDURE — 1160F RVW MEDS BY RX/DR IN RCRD: CPT | Performed by: FAMILY MEDICINE

## 2024-02-28 PROCEDURE — 99214 OFFICE O/P EST MOD 30 MIN: CPT | Performed by: FAMILY MEDICINE

## 2024-02-28 PROCEDURE — 1159F MED LIST DOCD IN RCRD: CPT | Performed by: FAMILY MEDICINE

## 2024-02-28 RX ORDER — CLONAZEPAM 0.5 MG/1
0.5 TABLET ORAL 2 TIMES DAILY PRN
Qty: 60 TABLET | Refills: 0 | Status: SHIPPED | OUTPATIENT
Start: 2024-02-28

## 2024-02-28 NOTE — PATIENT INSTRUCTIONS
Suicidal Feelings: How to Help Yourself  Suicide is when you end your own life. Suicidal ideation includes expressing thoughts about, or a preoccupation with, ending your own life. There are many things you can do to help yourself feel better when struggling with these feelings. Many services and people are available to support you and others who struggle with similar feelings.  If you ever feel like you may hurt yourself or others, or have thoughts about taking your own life, get help right away. To get help:  Go to your nearest emergency department.  Call your local emergency services (151 in the [place]  Call the [facility] services helpline (211 in the [place]  Call or text a suicide hotline to speak with a trained counselor. The following suicide hotlines are available in the [place]:  7-040-258-TALK (1-437.972.5215 or 939 in the [place]  6-368-FVAYLVW (1-848.104.1721).  Text 920844. This is the Crisis Text Line in the [place]  1-414.520.7712. This is a hotline for Maori speakers.  1-525.608.2380. This is a hotline for TTY users.  9-733-4-U-CODI (1-483.649.1710). This is a hotline for lesbian, yost, bisexual, transgender, or questioning youth.  For a list of hotlines in [place], visit suicide.org/hotlines/international/umpmwq-cqzhkkm-qdcngufn.html  Contact a crisis center or a local suicide prevention center. To find a crisis center or suicide prevention center:  Call your local hospital, clinic, community service organization, mental health center, social service provider, or health department. Ask for help with connecting to a crisis center.  For a list of crisis centers in the [place], visit: suicidepreventionlifeline.org  For a list of crisis centers in [place], visit: suicideprevention.ca  How to help yourself feel better    Promise yourself that you will not do anything bad or extreme when you have suicidal feelings. Remember the times you have felt hopeful.  Many people have  gotten through suicidal thoughts and feelings, and you can too.  If you have had these feelings before, remind yourself that you can get through them again.  Let family, friends, teachers, or counselors know how you are feeling. Do not separate yourself from those who care about you and want to help you.  Talk with someone every day, even if you do not feel like talking to anyone or being with other people.  Face-to-face conversation is best to help them understand your feelings.  Contact a mental health care provider and work with this person regularly.  Make a safety plan that you can follow during a crisis.  Include phone numbers of suicide prevention hotlines, mental health professionals, and trusted friends and family members you can call during an emergency.  Save these numbers on your phone.  If you are thinking of taking a lot of medicine, give your medicine to someone who can give it to you as prescribed.  If you are on antidepressants and are concerned you will overdose, tell your health care provider so that he or she can give you safer medicines.  Try to stick to your routines and follow a schedule every day. Make self-care a priority.  Make a list of realistic goals, and cross them off when you achieve them. Accomplishments can give you a sense of worth.  Wait until you are feeling better before doing things that you find difficult or unpleasant.  Do things that you have always enjoyed to take your mind off your feelings.  Try reading a book, or listening to or playing music.  Spending time outside, in nature, may help you feel better.  Follow these instructions at home:    Visit your primary health care provider every year for a physical and a mental health checkup.  Take over-the-counter and prescription medicines only as told by your health care provider.  Ask your health care provider about the possible side effects of any medicines you are taking.  Ask your health care provider about whether  suicidal ideation is a possible side effect of any of your medicines.  Learn about suicidal ideation and what increases the risk for the development of suicidal thoughts.  Eat a well-balanced diet, and eat regular meals.  Get plenty of rest.  Exercise if you are able. Just 30 minutes of exercise each day can help you feel better.  Keep your living space well lit.  Do not use alcohol or drugs. Remove these substances from your home.  General recommendations  Remove weapons, poisons, knives, and other deadly items from your home.  Work with a mental health care provider as needed.  When you are feeling well, write yourself a letter with tips and support that you can read when you are not feeling well.  Remember that life's difficulties can be sorted out with help. Conditions can be treated, and you can learn behaviors and ways of thinking that will help you.  Work with your health care provider or counselor to learn ways of coping with your thoughts and feelings.  Where to find more information  National Suicide Prevention Lifeline: www.suicidepreventionlifeline.org  Hopeline: www.hopeline.Aviacomm  American Foundation for Suicide Prevention: www.afsp.org  The Jeff Project (for lesbian, yost, bisexual, transgender, or questioning youth): www.thetrevorproject.org  National [place] of Mental Health: www.nimh.nih.gov/health/topics/suicide-prevention  Suicide Prevention Resources: afsp.org/suicide-prevention-resources  Contact a health care provider if:  You feel as though you are a burden to others.  You feel agitated, angry, vengeful, or have extreme mood swings.  You have withdrawn from family and friends.  You are frequently using drugs or alcohol.  Get help right away if:  You are talking about suicide or wishing to die.  You start making plans for how to commit suicide.  You feel that you have no reason to live.  You start making plans for putting your affairs in order, saying goodbye, or giving your possessions  away.  You feel guilt, shame, or unbearable pain, and it seems like there is no way out.  You are engaging in risky behaviors that could lead to death.  If you have any of these thoughts or symptoms, get help right away:  Go to your nearest emergency department or crisis center.  Call emergency services (911 in the [place]  Call or text a suicide crisis helpline.  Summary  Suicide is when you take your own life. Suicidal feelings are thoughts about ending your own life.  Promise yourself that you will not do anything bad or extreme when you have suicidal feelings.  Let family, friends, teachers, or counselors know how you are feeling.  Get help right away if you start making plans for how to commit suicide.  This information is not intended to replace advice given to you by your health care provider. Make sure you discuss any questions you have with your health care provider.  Document Revised: 2022 Document Reviewed: 2022  [employer] Patient Education ©  Elsevier Inc.    Preventing Suicide  You will learn about the warning sign of suicide, and how to prevent suicide.  To view the content, go to this web address:  [email or web address]    This video will  on: 2025. If you need access to this video following this date, please reach out to the healthcare provider who assigned it to you.  This information is not intended to replace advice given to you by your health care provider. Make sure you discuss any questions you have with your health care provider.  [employer] Patient Education ©  Elsevier Inc.

## 2024-02-28 NOTE — PROGRESS NOTES
"Chief Complaint  ADHD (Discuss starting medication )    Subjective    History of Present Illness    Dionna Panchal presents to [facility] for ADHD (Discuss starting medication ).  History of Present Illness     Here today as above. Stopped all of her medications recently secondary to increased anger. Has had significant recurrence of depressive symptoms as well as anxiety. Thinking she needs to be on a medication as soon as possible. ERIC-7 and PHQ-9 are both strongly positive. She has regular passive suicidality with a history of an attempt and has a plan. Has plans in place for prevention as well and contracts for safety today. Has tried bupropion in the past and did not react very well to it. Wondering if ADHD might contribute to her symptoms as well. Has increased stressors at home and is currently having a difficult time holding down a job. Feels as if she is disappointing and hurting others in her life and her life is falling apart. Has an appointment with a trauma-based therapist later today.     Objective     Vital Signs:   /70   Pulse 67   Resp 18   Ht 162.6 cm (64\")   Wt 92.1 kg (203 lb)   SpO2 99%   BMI 34.84 kg/m²   Physical Exam  Vitals and nursing note reviewed.   Constitutional:       General: She is not in acute distress.     Appearance: Normal appearance. She is not ill-appearing.   Cardiovascular:      Rate and Rhythm: Normal rate and regular rhythm.      Pulses: Normal pulses.      Heart sounds: Normal heart sounds. No murmur heard.  Neurological:      Mental Status: She is alert and oriented to person, place, and time. Mental status is at baseline.   Psychiatric:         Mood and Affect: Mood is depressed. Affect is tearful.                          Assessment and Plan   Diagnoses and all orders for this visit:    1. Recurrent moderate major depressive disorder with anxiety (Primary)  -     Discontinue: sertraline (ZOLOFT) 50 MG tablet; Take 1 tablet by " mouth Daily for 180 days.  Dispense: 90 tablet; Refill: 1  -     clonazePAM (KlonoPIN) 0.5 MG tablet; Take 1 tablet by mouth 2 (Two) Times a Day As Needed for Anxiety.  Dispense: 60 tablet; Refill: 0  -     sertraline (ZOLOFT) 50 MG tablet; Take 1 tablet by mouth Daily for 180 days.  Dispense: 90 tablet; Refill: 1    Long discussion about various options for management today. Will start sertraline as above. Discussed proper titration. Discussed anticipated effects and potential side effects. Warned against increased suicidality in the short-term. Will try to mitigate the symptoms with clonazepam as above. Will use this as needed to initiate sertraline, with the goal of this not being a long-term fix. Hoping also that a trauma-based therapist will provide some relief.    Recommended close follow-up in the short-term. Encouraged communication he [employer] the meantime. Contracts for safety today.    Patient was given instructions and counseling regarding her condition or for health maintenance advice. Please see specific information pulled into the AVS (placed there by myself) if appropriate.    Return in about 1 month (around 3/28/2024), or if symptoms worsen or fail to improve, for f/u depression, anxiety.    PABLO Lisa MD

## 2024-03-27 ENCOUNTER — OFFICE VISIT (OUTPATIENT)
Dept: FAMILY MEDICINE CLINIC | Facility: CLINIC | Age: 34
End: 2024-03-27
Payer: COMMERCIAL

## 2024-03-27 VITALS
HEART RATE: 60 BPM | HEIGHT: 64 IN | OXYGEN SATURATION: 96 % | RESPIRATION RATE: 18 BRPM | BODY MASS INDEX: 34.66 KG/M2 | WEIGHT: 203 LBS

## 2024-03-27 DIAGNOSIS — K59.09 CHRONIC CONSTIPATION: ICD-10-CM

## 2024-03-27 DIAGNOSIS — F41.9 RECURRENT MODERATE MAJOR DEPRESSIVE DISORDER WITH ANXIETY: Primary | ICD-10-CM

## 2024-03-27 DIAGNOSIS — F33.1 RECURRENT MODERATE MAJOR DEPRESSIVE DISORDER WITH ANXIETY: Primary | ICD-10-CM

## 2024-03-27 PROCEDURE — 99213 OFFICE O/P EST LOW 20 MIN: CPT | Performed by: FAMILY MEDICINE

## 2024-03-27 PROCEDURE — 1160F RVW MEDS BY RX/DR IN RCRD: CPT | Performed by: FAMILY MEDICINE

## 2024-03-27 PROCEDURE — 1159F MED LIST DOCD IN RCRD: CPT | Performed by: FAMILY MEDICINE

## 2024-03-27 RX ORDER — POLYETHYLENE GLYCOL 3350 17 G/17G
17 POWDER, FOR SOLUTION ORAL 2 TIMES DAILY
Qty: 850 G | Refills: 5 | Status: SHIPPED | OUTPATIENT
Start: 2024-03-27

## 2024-03-27 RX ORDER — ONDANSETRON 4 MG/1
4 TABLET, ORALLY DISINTEGRATING ORAL EVERY 8 HOURS PRN
Qty: 30 TABLET | Refills: 1 | Status: SHIPPED | OUTPATIENT
Start: 2024-03-27

## 2024-03-27 NOTE — PROGRESS NOTES
"Chief Complaint  Depression (1 month f/u) and Anxiety    Subjective    History of Present Illness    Dionna Panchal presents to [facility] for Depression (1 month f/u) and Anxiety.  History of Present Illness     Here today for follow-up as above. Has been doing well on sertraline, feels that it has been helpful though she remains fairly depressed. Is currently on 50 mg, has not titrated up further. Continues on clonazepam as needed, has found it quite useful in getting back on the sertraline. Has seen a new therapist, thinks it is going to be very helpful.    Has some morning nausea with occasional light vomiting. Has chronic constipation that has been somewhat worse of late. Has tried MiraLAX in the past with decent results though seemed somewhat inconsistent. Does not have a high-fiber diet at present.    Objective     Vital Signs:   Pulse 60   Resp 18   Ht 162.6 cm (64\")   Wt 92.1 kg (203 lb)   SpO2 96%   BMI 34.84 kg/m²   Physical Exam  Vitals and nursing note reviewed.   Constitutional:       General: She is not in acute distress.     Appearance: Normal appearance. She is not ill-appearing.   Cardiovascular:      Rate and Rhythm: Normal rate and regular rhythm.      Pulses: Normal pulses.      Heart sounds: Normal heart sounds. No murmur heard.  Pulmonary:      Effort: Pulmonary effort is normal. No respiratory distress.      Breath sounds: Normal breath sounds. No rales.   Abdominal:      General: Abdomen is flat. There is no distension.      Tenderness: There is no abdominal tenderness. There is no guarding or rebound.   Neurological:      Mental Status: She is alert and oriented to person, place, and time. Mental status is at baseline.   Psychiatric:         Mood and Affect: Mood normal.         Behavior: Behavior normal.                        Assessment and Plan   Diagnoses and all orders for this visit:    1. Recurrent moderate major depressive disorder with anxiety " (Primary)    2. Chronic constipation  -     polyethylene glycol (MiraLax) 17 GM/SCOOP powder; Take 17 g by mouth 2 (Two) Times a Day.  Dispense: 850 g; Refill: 5  -     ondansetron ODT (ZOFRAN-ODT) 4 MG disintegrating tablet; Place 1 tablet on the tongue Every 8 (Eight) Hours As Needed for Nausea or Vomiting.  Dispense: 30 tablet; Refill: 1    Continue sertraline and clonazepam as needed. Discussed increasing sertraline to 75 or 100 mg as desired. Continue with therapist as scheduled.    Discussed proper use of MiraLAX and Metamucil for management of constipation. Instructions provided in her after visit summary today. Ondansetron for use as needed.    Recommended follow up as below. Encouraged communication via [employer] in the meantime.     Patient was given instructions and counseling regarding her condition or for health maintenance advice. Please see specific information pulled into the AVS (placed there by myself) if appropriate.    Return in about 3 months (around 6/27/2024), or if symptoms worsen or fail to improve.    PABLO Lisa MD

## 2024-05-06 ENCOUNTER — TELEMEDICINE (OUTPATIENT)
Dept: FAMILY MEDICINE CLINIC | Facility: CLINIC | Age: 34
End: 2024-05-06
Payer: COMMERCIAL

## 2024-05-06 DIAGNOSIS — F90.0 ATTENTION DEFICIT HYPERACTIVITY DISORDER (ADHD), PREDOMINANTLY INATTENTIVE TYPE: ICD-10-CM

## 2024-05-06 DIAGNOSIS — F41.9 RECURRENT MODERATE MAJOR DEPRESSIVE DISORDER WITH ANXIETY: Primary | ICD-10-CM

## 2024-05-06 DIAGNOSIS — F33.1 RECURRENT MODERATE MAJOR DEPRESSIVE DISORDER WITH ANXIETY: Primary | ICD-10-CM

## 2024-05-06 PROCEDURE — 1160F RVW MEDS BY RX/DR IN RCRD: CPT | Performed by: FAMILY MEDICINE

## 2024-05-06 PROCEDURE — 99214 OFFICE O/P EST MOD 30 MIN: CPT | Performed by: FAMILY MEDICINE

## 2024-05-06 PROCEDURE — 1159F MED LIST DOCD IN RCRD: CPT | Performed by: FAMILY MEDICINE

## 2024-05-06 RX ORDER — CLONAZEPAM 0.5 MG/1
0.5 TABLET ORAL 2 TIMES DAILY PRN
Qty: 60 TABLET | Refills: 1 | Status: SHIPPED | OUTPATIENT
Start: 2024-05-06 | End: 2024-05-06 | Stop reason: SDUPTHER

## 2024-05-06 RX ORDER — CLONAZEPAM 0.5 MG/1
0.5 TABLET ORAL 2 TIMES DAILY PRN
Qty: 60 TABLET | Refills: 1 | Status: SHIPPED | OUTPATIENT
Start: 2024-05-06

## 2024-05-06 RX ORDER — FLUOXETINE HYDROCHLORIDE 20 MG/1
20 CAPSULE ORAL DAILY
Qty: 90 CAPSULE | Refills: 3 | Status: SHIPPED | OUTPATIENT
Start: 2024-05-06

## 2024-05-06 RX ORDER — DEXTROAMPHETAMINE SACCHARATE, AMPHETAMINE ASPARTATE MONOHYDRATE, DEXTROAMPHETAMINE SULFATE AND AMPHETAMINE SULFATE 3.75; 3.75; 3.75; 3.75 MG/1; MG/1; MG/1; MG/1
15 CAPSULE, EXTENDED RELEASE ORAL EVERY MORNING
Qty: 30 CAPSULE | Refills: 0 | Status: SHIPPED | OUTPATIENT
Start: 2024-05-06

## 2024-06-10 DIAGNOSIS — F90.0 ATTENTION DEFICIT HYPERACTIVITY DISORDER (ADHD), PREDOMINANTLY INATTENTIVE TYPE: Primary | ICD-10-CM

## 2024-06-10 RX ORDER — DEXTROAMPHETAMINE SACCHARATE, AMPHETAMINE ASPARTATE, DEXTROAMPHETAMINE SULFATE AND AMPHETAMINE SULFATE 3.75; 3.75; 3.75; 3.75 MG/1; MG/1; MG/1; MG/1
15 TABLET ORAL DAILY
Qty: 30 TABLET | Refills: 0 | Status: SHIPPED | OUTPATIENT
Start: 2024-06-10

## 2024-06-11 ENCOUNTER — PRIOR AUTHORIZATION (OUTPATIENT)
Dept: FAMILY MEDICINE CLINIC | Facility: CLINIC | Age: 34
End: 2024-06-11
Payer: COMMERCIAL

## 2024-06-11 NOTE — TELEPHONE ENCOUNTER
PA FOR Amphetamine-Dextroamphetamine 15MG tablets SUBMITTED.    Key: [id number] - PA Case ID: [id number] - Rx #: [id number]

## 2024-07-30 DIAGNOSIS — F90.0 ATTENTION DEFICIT HYPERACTIVITY DISORDER (ADHD), PREDOMINANTLY INATTENTIVE TYPE: ICD-10-CM

## 2024-07-30 DIAGNOSIS — R10.11 RIGHT UPPER QUADRANT ABDOMINAL PAIN: Primary | ICD-10-CM

## 2024-07-30 RX ORDER — DEXTROAMPHETAMINE SACCHARATE, AMPHETAMINE ASPARTATE, DEXTROAMPHETAMINE SULFATE AND AMPHETAMINE SULFATE 3.75; 3.75; 3.75; 3.75 MG/1; MG/1; MG/1; MG/1
15 TABLET ORAL DAILY
Qty: 30 TABLET | Refills: 0 | Status: SHIPPED | OUTPATIENT
Start: 2024-07-30

## 2024-09-11 DIAGNOSIS — F90.0 ATTENTION DEFICIT HYPERACTIVITY DISORDER (ADHD), PREDOMINANTLY INATTENTIVE TYPE: ICD-10-CM

## 2024-09-11 RX ORDER — DEXTROAMPHETAMINE SACCHARATE, AMPHETAMINE ASPARTATE, DEXTROAMPHETAMINE SULFATE AND AMPHETAMINE SULFATE 3.75; 3.75; 3.75; 3.75 MG/1; MG/1; MG/1; MG/1
15 TABLET ORAL DAILY
Qty: 30 TABLET | Refills: 0 | Status: SHIPPED | OUTPATIENT
Start: 2024-09-11

## 2024-10-15 DIAGNOSIS — F90.0 ATTENTION DEFICIT HYPERACTIVITY DISORDER (ADHD), PREDOMINANTLY INATTENTIVE TYPE: ICD-10-CM

## 2024-10-15 DIAGNOSIS — F33.1 RECURRENT MODERATE MAJOR DEPRESSIVE DISORDER WITH ANXIETY: ICD-10-CM

## 2024-10-15 DIAGNOSIS — F41.9 RECURRENT MODERATE MAJOR DEPRESSIVE DISORDER WITH ANXIETY: ICD-10-CM

## 2024-10-16 RX ORDER — DEXTROAMPHETAMINE SACCHARATE, AMPHETAMINE ASPARTATE, DEXTROAMPHETAMINE SULFATE AND AMPHETAMINE SULFATE 3.75; 3.75; 3.75; 3.75 MG/1; MG/1; MG/1; MG/1
15 TABLET ORAL DAILY
Qty: 30 TABLET | Refills: 0 | Status: CANCELLED | OUTPATIENT
Start: 2024-10-16

## 2024-10-16 RX ORDER — CLONAZEPAM 0.5 MG/1
0.5 TABLET ORAL 2 TIMES DAILY PRN
Qty: 60 TABLET | Refills: 1 | Status: CANCELLED | OUTPATIENT
Start: 2024-10-16

## 2024-10-18 DIAGNOSIS — F33.1 RECURRENT MODERATE MAJOR DEPRESSIVE DISORDER WITH ANXIETY: ICD-10-CM

## 2024-10-18 DIAGNOSIS — F41.9 RECURRENT MODERATE MAJOR DEPRESSIVE DISORDER WITH ANXIETY: ICD-10-CM

## 2024-10-18 DIAGNOSIS — F90.0 ATTENTION DEFICIT HYPERACTIVITY DISORDER (ADHD), PREDOMINANTLY INATTENTIVE TYPE: ICD-10-CM

## 2024-10-18 RX ORDER — DEXTROAMPHETAMINE SACCHARATE, AMPHETAMINE ASPARTATE, DEXTROAMPHETAMINE SULFATE AND AMPHETAMINE SULFATE 5; 5; 5; 5 MG/1; MG/1; MG/1; MG/1
20 TABLET ORAL DAILY
Qty: 30 TABLET | Refills: 0 | Status: SHIPPED | OUTPATIENT
Start: 2024-10-18

## 2024-10-18 RX ORDER — CLONAZEPAM 0.5 MG/1
0.5 TABLET ORAL 2 TIMES DAILY PRN
Qty: 60 TABLET | Refills: 0 | Status: SHIPPED | OUTPATIENT
Start: 2024-10-18

## 2024-12-27 ENCOUNTER — TELEPHONE (OUTPATIENT)
Dept: FAMILY MEDICINE CLINIC | Facility: CLINIC | Age: 34
End: 2024-12-27
Payer: COMMERCIAL

## 2024-12-27 DIAGNOSIS — S61.419D LACERATION OF HAND WITHOUT FOREIGN BODY, UNSPECIFIED LATERALITY, SUBSEQUENT ENCOUNTER: Primary | ICD-10-CM

## 2024-12-27 NOTE — TELEPHONE ENCOUNTER
Patient mom called she is not on the verbal release. Explain I could not tell her anything. She wanted to let Dr. Lisa know that she cut on Dionna hand is needing another antibiotic to go along with the one given in ER.  Also mom is wanting us to refer her to a hand surgeon for follow up. Mom is asking not to tell patient she called.

## 2024-12-27 NOTE — TELEPHONE ENCOUNTER
Returned patient's mother's call to state that we cannot give her any medical information regarding the patient, nor route her request to the patient's provider as it did not come directly from the patient. Encouraged the patient's mother to ask the patient to contact the office if she would like to request an antibiotic or discuss a referral to a hand surgeon. She verbalized an understanding. No further action required.

## 2025-01-08 ENCOUNTER — OFFICE VISIT (OUTPATIENT)
Dept: FAMILY MEDICINE CLINIC | Facility: CLINIC | Age: 35
End: 2025-01-08
Payer: COMMERCIAL

## 2025-01-08 VITALS
RESPIRATION RATE: 14 BRPM | BODY MASS INDEX: 26.67 KG/M2 | HEART RATE: 96 BPM | DIASTOLIC BLOOD PRESSURE: 80 MMHG | OXYGEN SATURATION: 99 % | WEIGHT: 156.2 LBS | SYSTOLIC BLOOD PRESSURE: 120 MMHG | HEIGHT: 64 IN

## 2025-01-08 DIAGNOSIS — E34.9 HORMONE IMBALANCE: ICD-10-CM

## 2025-01-08 DIAGNOSIS — F64.0 GENDER DYSPHORIA IN ADULT: ICD-10-CM

## 2025-01-08 DIAGNOSIS — F33.1 RECURRENT MODERATE MAJOR DEPRESSIVE DISORDER WITH ANXIETY: Primary | ICD-10-CM

## 2025-01-08 DIAGNOSIS — S61.411S: ICD-10-CM

## 2025-01-08 DIAGNOSIS — F41.9 RECURRENT MODERATE MAJOR DEPRESSIVE DISORDER WITH ANXIETY: Primary | ICD-10-CM

## 2025-01-08 PROBLEM — S61.411A LACERATION OF RIGHT HAND WITH COMPLICATION: Status: ACTIVE | Noted: 2025-01-08

## 2025-01-08 PROCEDURE — 1160F RVW MEDS BY RX/DR IN RCRD: CPT | Performed by: FAMILY MEDICINE

## 2025-01-08 PROCEDURE — 1125F AMNT PAIN NOTED PAIN PRSNT: CPT | Performed by: FAMILY MEDICINE

## 2025-01-08 PROCEDURE — 99214 OFFICE O/P EST MOD 30 MIN: CPT | Performed by: FAMILY MEDICINE

## 2025-01-08 PROCEDURE — 1159F MED LIST DOCD IN RCRD: CPT | Performed by: FAMILY MEDICINE

## 2025-01-08 RX ORDER — TESTOSTERONE CYPIONATE 200 MG/ML
INJECTION, SOLUTION INTRAMUSCULAR
Qty: 4 ML | Refills: 5 | Status: SHIPPED | OUTPATIENT
Start: 2025-01-08
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
## 2025-07-24 RX ORDER — PRAZOSIN HYDROCHLORIDE 2 MG/1
2 CAPSULE ORAL 2 TIMES DAILY
Qty: 180 CAPSULE | Refills: 1 | Status: SHIPPED | OUTPATIENT
Start: 2025-07-24

## 2025-07-24 RX ORDER — HYDROXYZINE HYDROCHLORIDE 25 MG/1
25 TABLET, FILM COATED ORAL NIGHTLY
Qty: 90 TABLET | Refills: 1 | Status: SHIPPED | OUTPATIENT
Start: 2025-07-24

## 2025-07-24 RX ORDER — TRAZODONE HYDROCHLORIDE 50 MG/1
50 TABLET ORAL NIGHTLY
Qty: 90 TABLET | Refills: 1 | Status: SHIPPED | OUTPATIENT
Start: 2025-07-24

## 2025-08-18 ENCOUNTER — OFFICE VISIT (OUTPATIENT)
Dept: FAMILY MEDICINE CLINIC | Facility: CLINIC | Age: 35
End: 2025-08-18
Payer: COMMERCIAL

## 2025-08-18 VITALS
SYSTOLIC BLOOD PRESSURE: 114 MMHG | HEIGHT: 64 IN | DIASTOLIC BLOOD PRESSURE: 90 MMHG | BODY MASS INDEX: 27.76 KG/M2 | WEIGHT: 162.6 LBS | OXYGEN SATURATION: 98 % | RESPIRATION RATE: 14 BRPM | HEART RATE: 66 BPM

## 2025-08-18 DIAGNOSIS — F44.5 PSYCHOGENIC NONEPILEPTIC SEIZURE: Primary | ICD-10-CM

## 2025-08-18 DIAGNOSIS — F64.0 GENDER DYSPHORIA IN ADULT: ICD-10-CM

## 2025-08-18 DIAGNOSIS — F51.5 NIGHTMARE DISORDER: ICD-10-CM

## 2025-08-18 PROCEDURE — 1125F AMNT PAIN NOTED PAIN PRSNT: CPT | Performed by: FAMILY MEDICINE

## 2025-08-18 PROCEDURE — 1160F RVW MEDS BY RX/DR IN RCRD: CPT | Performed by: FAMILY MEDICINE

## 2025-08-18 PROCEDURE — 99214 OFFICE O/P EST MOD 30 MIN: CPT | Performed by: FAMILY MEDICINE

## 2025-08-18 PROCEDURE — 1159F MED LIST DOCD IN RCRD: CPT | Performed by: FAMILY MEDICINE
